# Patient Record
Sex: MALE | Race: WHITE | NOT HISPANIC OR LATINO | Employment: FULL TIME | URBAN - METROPOLITAN AREA
[De-identification: names, ages, dates, MRNs, and addresses within clinical notes are randomized per-mention and may not be internally consistent; named-entity substitution may affect disease eponyms.]

---

## 2017-12-12 ENCOUNTER — GENERIC CONVERSION - ENCOUNTER (OUTPATIENT)
Dept: OTHER | Facility: OTHER | Age: 27
End: 2017-12-12

## 2018-01-14 NOTE — MISCELLANEOUS
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

## 2018-01-14 NOTE — MISCELLANEOUS
Message  patient notified of normal   labs   and xray  no change in treatment      Signatures   Electronically signed by : Aby eLon DO; Dec 12 2016  6:54PM EST                       (Author)

## 2018-01-24 VITALS
HEIGHT: 73 IN | WEIGHT: 178 LBS | HEART RATE: 90 BPM | DIASTOLIC BLOOD PRESSURE: 80 MMHG | SYSTOLIC BLOOD PRESSURE: 122 MMHG | BODY MASS INDEX: 23.59 KG/M2

## 2019-04-18 ENCOUNTER — OFFICE VISIT (OUTPATIENT)
Dept: URGENT CARE | Facility: MEDICAL CENTER | Age: 29
End: 2019-04-18
Payer: COMMERCIAL

## 2019-04-18 ENCOUNTER — HOSPITAL ENCOUNTER (EMERGENCY)
Facility: HOSPITAL | Age: 29
Discharge: HOME | End: 2019-04-19
Attending: EMERGENCY MEDICINE | Admitting: EMERGENCY MEDICINE
Payer: COMMERCIAL

## 2019-04-18 VITALS
HEART RATE: 89 BPM | RESPIRATION RATE: 16 BRPM | WEIGHT: 185 LBS | HEIGHT: 73 IN | DIASTOLIC BLOOD PRESSURE: 78 MMHG | BODY MASS INDEX: 24.52 KG/M2 | TEMPERATURE: 98 F | SYSTOLIC BLOOD PRESSURE: 150 MMHG | OXYGEN SATURATION: 99 %

## 2019-04-18 DIAGNOSIS — M79.605 LEFT LEG PAIN: Primary | ICD-10-CM

## 2019-04-18 DIAGNOSIS — M79.662 PAIN OF LEFT CALF: Primary | ICD-10-CM

## 2019-04-18 DIAGNOSIS — M62.831 MUSCLE SPASM OF CALF: ICD-10-CM

## 2019-04-18 PROCEDURE — G0382 LEV 3 HOSP TYPE B ED VISIT: HCPCS | Performed by: FAMILY MEDICINE

## 2019-04-18 PROCEDURE — 99283 EMERGENCY DEPT VISIT LOW MDM: CPT

## 2019-04-18 PROCEDURE — S9083 URGENT CARE CENTER GLOBAL: HCPCS | Performed by: FAMILY MEDICINE

## 2019-04-19 VITALS
WEIGHT: 185 LBS | OXYGEN SATURATION: 100 % | DIASTOLIC BLOOD PRESSURE: 74 MMHG | RESPIRATION RATE: 16 BRPM | BODY MASS INDEX: 24.41 KG/M2 | SYSTOLIC BLOOD PRESSURE: 148 MMHG | TEMPERATURE: 98 F | HEART RATE: 64 BPM

## 2019-04-19 LAB
ALBUMIN SERPL BCP-MCNC: 4.1 G/DL (ref 3.5–5)
ALP SERPL-CCNC: 68 U/L (ref 46–116)
ALT SERPL W P-5'-P-CCNC: 33 U/L (ref 12–78)
ANION GAP SERPL CALCULATED.3IONS-SCNC: 9 MMOL/L (ref 4–13)
APTT PPP: 32 SECONDS (ref 26–38)
AST SERPL W P-5'-P-CCNC: 14 U/L (ref 5–45)
BASOPHILS # BLD AUTO: 0.07 THOUSANDS/ΜL (ref 0–0.1)
BASOPHILS NFR BLD AUTO: 1 % (ref 0–1)
BILIRUB SERPL-MCNC: 0.27 MG/DL (ref 0.2–1)
BUN SERPL-MCNC: 21 MG/DL (ref 5–25)
CALCIUM SERPL-MCNC: 9.5 MG/DL (ref 8.3–10.1)
CHLORIDE SERPL-SCNC: 104 MMOL/L (ref 100–108)
CO2 SERPL-SCNC: 28 MMOL/L (ref 21–32)
CREAT SERPL-MCNC: 1.08 MG/DL (ref 0.6–1.3)
DEPRECATED D DIMER PPP: <270 NG/ML (FEU)
EOSINOPHIL # BLD AUTO: 0.25 THOUSAND/ΜL (ref 0–0.61)
EOSINOPHIL NFR BLD AUTO: 3 % (ref 0–6)
ERYTHROCYTE [DISTWIDTH] IN BLOOD BY AUTOMATED COUNT: 11.6 % (ref 11.6–15.1)
GFR SERPL CREATININE-BSD FRML MDRD: 94 ML/MIN/1.73SQ M
GLUCOSE SERPL-MCNC: 93 MG/DL (ref 65–140)
HCT VFR BLD AUTO: 44.7 % (ref 36.5–49.3)
HGB BLD-MCNC: 15.1 G/DL (ref 12–17)
IMM GRANULOCYTES # BLD AUTO: 0.02 THOUSAND/UL (ref 0–0.2)
IMM GRANULOCYTES NFR BLD AUTO: 0 % (ref 0–2)
INR PPP: 1.12 (ref 0.86–1.17)
LYMPHOCYTES # BLD AUTO: 3.08 THOUSANDS/ΜL (ref 0.6–4.47)
LYMPHOCYTES NFR BLD AUTO: 38 % (ref 14–44)
MCH RBC QN AUTO: 31.2 PG (ref 26.8–34.3)
MCHC RBC AUTO-ENTMCNC: 33.8 G/DL (ref 31.4–37.4)
MCV RBC AUTO: 92 FL (ref 82–98)
MONOCYTES # BLD AUTO: 0.49 THOUSAND/ΜL (ref 0.17–1.22)
MONOCYTES NFR BLD AUTO: 6 % (ref 4–12)
NEUTROPHILS # BLD AUTO: 4.17 THOUSANDS/ΜL (ref 1.85–7.62)
NEUTS SEG NFR BLD AUTO: 52 % (ref 43–75)
NRBC BLD AUTO-RTO: 0 /100 WBCS
PLATELET # BLD AUTO: 258 THOUSANDS/UL (ref 149–390)
PMV BLD AUTO: 9.4 FL (ref 8.9–12.7)
POTASSIUM SERPL-SCNC: 3.9 MMOL/L (ref 3.5–5.3)
PROT SERPL-MCNC: 7.5 G/DL (ref 6.4–8.2)
PROTHROMBIN TIME: 14.5 SECONDS (ref 11.8–14.2)
RBC # BLD AUTO: 4.84 MILLION/UL (ref 3.88–5.62)
SODIUM SERPL-SCNC: 141 MMOL/L (ref 136–145)
WBC # BLD AUTO: 8.08 THOUSAND/UL (ref 4.31–10.16)

## 2019-04-19 PROCEDURE — 85730 THROMBOPLASTIN TIME PARTIAL: CPT | Performed by: NURSE PRACTITIONER

## 2019-04-19 PROCEDURE — 36415 COLL VENOUS BLD VENIPUNCTURE: CPT | Performed by: NURSE PRACTITIONER

## 2019-04-19 PROCEDURE — 85379 FIBRIN DEGRADATION QUANT: CPT | Performed by: NURSE PRACTITIONER

## 2019-04-19 PROCEDURE — 85610 PROTHROMBIN TIME: CPT | Performed by: NURSE PRACTITIONER

## 2019-04-19 PROCEDURE — 80053 COMPREHEN METABOLIC PANEL: CPT | Performed by: NURSE PRACTITIONER

## 2019-04-19 PROCEDURE — 85025 COMPLETE CBC W/AUTO DIFF WBC: CPT | Performed by: NURSE PRACTITIONER

## 2019-04-19 PROCEDURE — 99282 EMERGENCY DEPT VISIT SF MDM: CPT | Performed by: NURSE PRACTITIONER

## 2019-04-24 ENCOUNTER — OFFICE VISIT (OUTPATIENT)
Dept: FAMILY MEDICINE CLINIC | Facility: CLINIC | Age: 29
End: 2019-04-24
Payer: COMMERCIAL

## 2019-04-24 VITALS
HEIGHT: 73 IN | DIASTOLIC BLOOD PRESSURE: 80 MMHG | SYSTOLIC BLOOD PRESSURE: 140 MMHG | HEART RATE: 105 BPM | WEIGHT: 187.4 LBS | BODY MASS INDEX: 24.84 KG/M2 | TEMPERATURE: 97.2 F

## 2019-04-24 DIAGNOSIS — Z00.00 ENCOUNTER FOR WELL ADULT EXAM WITHOUT ABNORMAL FINDINGS: Primary | ICD-10-CM

## 2019-04-24 DIAGNOSIS — D68.9 COAGULOPATHY (HCC): ICD-10-CM

## 2019-04-24 DIAGNOSIS — Z23 NEED FOR DTAP VACCINATION: ICD-10-CM

## 2019-04-24 DIAGNOSIS — Z13.6 SCREENING FOR CARDIOVASCULAR CONDITION: ICD-10-CM

## 2019-04-24 PROBLEM — R41.841 COGNITIVE COMMUNICATION DEFICIT: Status: ACTIVE | Noted: 2018-11-02

## 2019-04-24 PROBLEM — S06.0X0A CONCUSSION WITH NO LOSS OF CONSCIOUSNESS: Status: ACTIVE | Noted: 2018-11-02

## 2019-04-24 PROBLEM — G44.309 POST-TRAUMATIC HEADACHE, NOT INTRACTABLE: Status: ACTIVE | Noted: 2018-11-21

## 2019-04-24 PROCEDURE — 90715 TDAP VACCINE 7 YRS/> IM: CPT | Performed by: FAMILY MEDICINE

## 2019-04-24 PROCEDURE — 90471 IMMUNIZATION ADMIN: CPT | Performed by: FAMILY MEDICINE

## 2019-04-24 PROCEDURE — 99395 PREV VISIT EST AGE 18-39: CPT | Performed by: FAMILY MEDICINE

## 2019-07-02 ENCOUNTER — TELEPHONE (OUTPATIENT)
Dept: FAMILY MEDICINE CLINIC | Facility: CLINIC | Age: 29
End: 2019-07-02

## 2019-07-02 NOTE — TELEPHONE ENCOUNTER
Patient's sister in law had tested positive for TB and it was suggested that any that had been in contact with her get tested  Should patient be schedule for an appointment or can labs be ordered  He uses the [facility] outpatient lab in [place] and his wife can be reached at [phone]

## 2019-07-03 DIAGNOSIS — Z20.1 EXPOSURE TO TB: Primary | ICD-10-CM

## 2019-10-06 LAB
CHOLEST SERPL-MCNC: 184 MG/DL
CHOLEST/HDLC SERPL: 3.2 (CALC)
HDLC SERPL-MCNC: 58 MG/DL
LDLC SERPL CALC-MCNC: 110 MG/DL (CALC)
NONHDLC SERPL-MCNC: 126 MG/DL (CALC)
TRIGL SERPL-MCNC: 74 MG/DL

## 2020-11-28 ENCOUNTER — NURSE TRIAGE (OUTPATIENT)
Dept: OTHER | Facility: OTHER | Age: 30
End: 2020-11-28

## 2020-12-01 ENCOUNTER — TELEMEDICINE (OUTPATIENT)
Dept: FAMILY MEDICINE CLINIC | Facility: CLINIC | Age: 30
End: 2020-12-01
Payer: COMMERCIAL

## 2020-12-01 DIAGNOSIS — Z20.822 EXPOSURE TO COVID-19 VIRUS: ICD-10-CM

## 2020-12-01 DIAGNOSIS — Z20.822 EXPOSURE TO COVID-19 VIRUS: Primary | ICD-10-CM

## 2020-12-01 PROCEDURE — U0003 INFECTIOUS AGENT DETECTION BY NUCLEIC ACID (DNA OR RNA); SEVERE ACUTE RESPIRATORY SYNDROME CORONAVIRUS 2 (SARS-COV-2) (CORONAVIRUS DISEASE [COVID-19]), AMPLIFIED PROBE TECHNIQUE, MAKING USE OF HIGH THROUGHPUT TECHNOLOGIES AS DESCRIBED BY CMS-2020-01-R: HCPCS | Performed by: FAMILY MEDICINE

## 2020-12-01 PROCEDURE — 99213 OFFICE O/P EST LOW 20 MIN: CPT | Performed by: FAMILY MEDICINE

## 2020-12-01 PROCEDURE — 1036F TOBACCO NON-USER: CPT | Performed by: FAMILY MEDICINE

## 2020-12-01 RX ORDER — MELOXICAM 15 MG/1
15 TABLET ORAL DAILY
COMMUNITY
Start: 2020-10-20

## 2020-12-02 LAB — SARS-COV-2 RNA SPEC QL NAA+PROBE: NOT DETECTED

## 2021-11-13 ENCOUNTER — OFFICE VISIT (OUTPATIENT)
Dept: URGENT CARE | Facility: CLINIC | Age: 31
End: 2021-11-13
Payer: COMMERCIAL

## 2021-11-13 VITALS
RESPIRATION RATE: 20 BRPM | WEIGHT: 189 LBS | OXYGEN SATURATION: 99 % | HEART RATE: 94 BPM | TEMPERATURE: 98.4 F | BODY MASS INDEX: 25.05 KG/M2 | HEIGHT: 73 IN | SYSTOLIC BLOOD PRESSURE: 119 MMHG | DIASTOLIC BLOOD PRESSURE: 80 MMHG

## 2021-11-13 DIAGNOSIS — L01.00 IMPETIGO: ICD-10-CM

## 2021-11-13 DIAGNOSIS — L08.9 SKIN INFECTION: Primary | ICD-10-CM

## 2021-11-13 PROCEDURE — G0382 LEV 3 HOSP TYPE B ED VISIT: HCPCS | Performed by: NURSE PRACTITIONER

## 2021-11-13 PROCEDURE — S9083 URGENT CARE CENTER GLOBAL: HCPCS | Performed by: NURSE PRACTITIONER

## 2021-11-13 RX ORDER — SULFAMETHOXAZOLE AND TRIMETHOPRIM 800; 160 MG/1; MG/1
1 TABLET ORAL EVERY 12 HOURS SCHEDULED
Qty: 20 TABLET | Refills: 0 | Status: SHIPPED | OUTPATIENT
Start: 2021-11-13 | End: 2021-11-23

## 2022-08-11 ENCOUNTER — OFFICE VISIT (OUTPATIENT)
Dept: FAMILY MEDICINE CLINIC | Facility: CLINIC | Age: 32
End: 2022-08-11
Payer: COMMERCIAL

## 2022-08-11 VITALS
DIASTOLIC BLOOD PRESSURE: 58 MMHG | HEIGHT: 73 IN | RESPIRATION RATE: 18 BRPM | TEMPERATURE: 97.5 F | OXYGEN SATURATION: 98 % | HEART RATE: 88 BPM | SYSTOLIC BLOOD PRESSURE: 120 MMHG | BODY MASS INDEX: 24.81 KG/M2 | WEIGHT: 187.2 LBS

## 2022-08-11 DIAGNOSIS — H60.502 ACUTE OTITIS EXTERNA OF LEFT EAR, UNSPECIFIED TYPE: ICD-10-CM

## 2022-08-11 DIAGNOSIS — B35.1 TOENAIL FUNGUS: ICD-10-CM

## 2022-08-11 DIAGNOSIS — L98.9 SKIN LESIONS: ICD-10-CM

## 2022-08-11 DIAGNOSIS — Z13.6 SCREENING FOR CARDIOVASCULAR CONDITION: Primary | ICD-10-CM

## 2022-08-11 PROCEDURE — 99214 OFFICE O/P EST MOD 30 MIN: CPT | Performed by: NURSE PRACTITIONER

## 2022-08-11 NOTE — PATIENT INSTRUCTIONS
Have labs drawn- will call with results  If lab work is normal then we can treat you for toenail fungus  Referral made for Dermatology  Swimmer's Ear   WHAT YOU NEED TO KNOW:   Swimmer's ear, also called otitis externa, is an infection in the outer ear canal  This canal goes from the outside of your ear to your eardrum  Swimmer's ear most often occurs when water remains in your ear after you swim  This creates a moist area for bacteria to grow  Scratches or damage from your fingers, cotton swabs, or other objects can also cause swimmer's ear  DISCHARGE INSTRUCTIONS:   Return to the emergency department if:   You have severe ear pain  You are suddenly not able to hear  You have new swelling in your face, behind your ears, or in your neck  You suddenly cannot move part of your face, or it feels numb  Call your doctor if:   You have a fever  Your symptoms get worse or do not go away, even after treatment  You have questions or concerns about your condition or care  Treatment for swimmer's ear  may include cleaning your outer ear canal first  This will help clean any ear wax, flaky skin, or other discharge  You may then need any of the following:  Medicines:      Ear drops  help fight infection and decrease redness and swelling  Acetaminophen  decreases pain and fever  It is available without a doctor's order  Ask how much to take and how often to take it  Follow directions  Read the labels of all other medicines you are using to see if they also contain acetaminophen, or ask your doctor or pharmacist  Acetaminophen can cause liver damage if not taken correctly  Do not use more than 4 grams (4,000 milligrams) total of acetaminophen in one day  NSAIDs , such as ibuprofen, help decrease swelling, pain, and fever  This medicine is available with or without a doctor's order  NSAIDs can cause stomach bleeding or kidney problems in certain people   If you take blood thinner medicine, always ask your healthcare provider if NSAIDs are safe for you  Always read the medicine label and follow directions  An ear wick  may be used if your ear canal is blocked  A wick (small tube) made of cotton or gauze is placed in your ear  The wick helps pull extra fluid out of your ear canal  Shira also help draw medicine into your ear canal     How to use ear drops:   Warm the bottle of ear drops in your hands  for a few minutes  Lie down on your side with your infected ear facing up  This will help the medicine travel completely through your ear canal     Gently pull the ear up and back  Carefully drip the correct number of ear drops into your ear  Have another person help you if possible  For children younger than 3 years,  gently pull and hold the ear down and back  For children older than 3 years,  gently pull and hold the ear up and back  Stay in the same position  for 3 to 5 minutes to let the medicine soak in  Prevent swimmer's ear:   Dry your ears completely after you swim or bathe  Gently wipe your outer ear with a soft towel or cloth  Use ear plugs when you swim  Do not put cotton swabs or other objects in your ears  These can scratch or damage your ear  They can also push ear wax deeper in and irritate your ear  Put cotton balls gently in your outer ear  when you apply hair spray, hair dye, or perfume  Follow up with your doctor as directed:  Write down your questions so you remember to ask them during your visits  © [employer] 2022 Information is for End User's use only and may not be sold, redistributed or otherwise used for commercial purposes  All illustrations and images included in CareNotes® are the copyrighted property of [employer]  or [facility] Muna Delgado   The above information is an  only  It is not intended as medical advice for individual conditions or treatments   Talk to your doctor, nurse or pharmacist before following any medical regimen to see if it is safe and effective for you  Nail Fungus   AMBULATORY CARE:   Nail fungus , or onychomycosis, is a fungal infection in your toenail or fingernail  Nail fungus is more common in toenails than fingernails  The cause of the infection may not be known  Common symptoms include the following:   Nails that curl up or down or are misshapen    Discolored (often white, yellow, or brown) nails    Fragile or cracked nails    Thick nails or nails with rough, jagged edges    Nail that is  from the nail bed    Tenderness or pain in the affected nail    Contact your healthcare provider if:  You have questions or concerns about your condition or care  Treatment for nail fungus  may include antifungal medicine and topical treatments  Antifungal medicine is a pill that treats a fungal infection  You may need to take this medicine for up to 12 weeks  Topical treatments include creams and polishes that you apply to the top of your nail  You may need to use topical treatments for up to 1 year before you see positive results  Ask your healthcare provider for more information about antifungal medicine  Manage your symptoms:   Use antifungal sprays or powders  You can buy these at your local drugstore  Keep your nails short  and file down any thick areas  Use separate nail trimmers and files for infected nails and healthy nails  If you go to a salon to get your nails done, bring your own nail files and trimmers  Prevent nail fungus:   Dry your feet with a towel  or hair dryer after you bathe  Do not wear tight-fitting shoes  or shoes that pinch your toes  Avoid shoes made from rubber or plastic  Wear socks that absorb moisture  This includes socks make of wool, nylon, or polypropylene  Do not wear cotton socks  Change your socks if they are damp from sweat or your feet get wet  Put on dry, clean socks every day  Do not go barefoot  in locker rooms or public showers      Do not use nail polish  or artificial nails such as acrylic or gel nails  Wear gloves that are waterproof  if you work with water  Follow up with your doctor as directed:  Write down your questions so you remember to ask them during your visits  © Copyright [employer] 2022 Information is for End User's use only and may not be sold, redistributed or otherwise used for commercial purposes  All illustrations and images included in CareNotes® are the copyrighted property of [employer]  or Sunita Delgado   The above information is an  only  It is not intended as medical advice for individual conditions or treatments  Talk to your doctor, nurse or pharmacist before following any medical regimen to see if it is safe and effective for you

## 2022-08-11 NOTE — PROGRESS NOTES
Assessment/Plan:    No problem-specific Assessment & Plan notes found for this encounter  Problem List Items Addressed This Visit    None     Visit Diagnoses     Screening for cardiovascular condition    -  Primary    Relevant Orders    CBC and Platelet    Comprehensive metabolic panel    Lipid panel    Acute otitis externa of left ear, unspecified type        Relevant Medications    neomycin-polymyxin-hydrocortisone (CORTISPORIN) otic solution    Skin lesions        Relevant Orders    Ambulatory Referral to Dermatology    Toenail fungus                Subjective:      Patient ID: Ramon Blanchard is a 29 y o  male  Patient here today for left ear pain  Patient was swimming last week and since then it has been bothering him  Patient feels like ear is full of water  No drainage from ear  No fever or chills  Patient also thinks he has warts on the left side of his neck  He has had these skin lesions for a couple of years  Lesions have not changed and are not painful they just won't go away  Patient tried over the counter remedies and tried freezing them off with no improvement  Patient also reports toenail fungus on right big toe and middle 3rd toe and on left foot 3rd and 4th toes  Discussed that he would need lab work to check his liver function and if liver function is normal then he could start Lamisil treatment for 12 weeks for toenail fungus  The following portions of the patient's history were reviewed and updated as appropriate: allergies, current medications, past family history, past medical history, past social history, past surgical history and problem list     Review of Systems   Constitutional: Negative  Negative for chills, fatigue and fever  HENT: Positive for ear pain (left ear pain )  Negative for congestion, ear discharge, rhinorrhea, sinus pressure, sinus pain and sore throat  Eyes: Negative  Negative for pain, discharge and visual disturbance  Respiratory: Negative  Negative for cough, chest tightness, shortness of breath and wheezing  Cardiovascular: Negative  Negative for chest pain and palpitations  Gastrointestinal: Negative  Negative for abdominal pain, constipation, diarrhea, nausea and vomiting  Endocrine: Negative  Negative for polydipsia and polyuria  Genitourinary: Negative  Negative for difficulty urinating, dysuria and hematuria  Musculoskeletal: Negative  Negative for arthralgias and myalgias  Skin: Negative  Negative for rash and wound  Skin lesions to neck  Toe nail fungus to toenails  Allergic/Immunologic: Negative  Negative for environmental allergies  Neurological: Negative  Negative for dizziness, seizures, syncope and light-headedness  Hematological: Negative  Does not bruise/bleed easily  Psychiatric/Behavioral: Negative  Negative for dysphoric mood  The patient is not nervous/anxious  Objective:      /58 (BP Location: Left arm, Patient Position: Sitting, Cuff Size: Large)   Pulse 88   Temp 97 5 °F (36 4 °C) (Tympanic)   Resp 18   Ht 6' 1" (1 854 m)   Wt 84 9 kg (187 lb 3 2 oz)   SpO2 98%   BMI 24 70 kg/m²          Physical Exam  Vitals and nursing note reviewed  Constitutional:       General: He is not in acute distress  Appearance: Normal appearance  He is not ill-appearing, toxic-appearing or diaphoretic  HENT:      Head: Normocephalic and atraumatic  Right Ear: Tympanic membrane, ear canal and external ear normal  There is no impacted cerumen  Tympanic membrane is not erythematous  Left Ear: Tympanic membrane and external ear normal  There is no impacted cerumen  Tympanic membrane is not erythematous  Ears:      Comments: Left ear canal with erythema      Nose: Nose normal  No congestion or rhinorrhea  Mouth/Throat:      Mouth: Mucous membranes are moist       Pharynx: Oropharynx is clear  No oropharyngeal exudate or posterior oropharyngeal erythema     Eyes: Extraocular Movements: Extraocular movements intact  Conjunctiva/sclera: Conjunctivae normal    Cardiovascular:      Rate and Rhythm: Normal rate and regular rhythm  Pulses: Normal pulses  Heart sounds: Normal heart sounds  No murmur heard  Pulmonary:      Effort: Pulmonary effort is normal  No respiratory distress  Breath sounds: Normal breath sounds  No wheezing  Abdominal:      General: Bowel sounds are normal  There is no distension  Palpations: Abdomen is soft  Tenderness: There is no abdominal tenderness  Musculoskeletal:         General: Normal range of motion  Cervical back: Normal range of motion and neck supple  Right lower leg: No edema  Left lower leg: No edema  Lymphadenopathy:      Cervical: No cervical adenopathy  Skin:     General: Skin is warm and dry  Capillary Refill: Capillary refill takes less than 2 seconds  Findings: Lesion (2 small flesh colored lesions to left posterior neck) present  No rash  Comments: Yellow discoloration of nail bed to right big toe and 3rd toe, left 3rd and 4th toes   Neurological:      General: No focal deficit present  Mental Status: He is alert and oriented to person, place, and time  Psychiatric:         Mood and Affect: Mood normal          Behavior: Behavior normal          Thought Content:  Thought content normal          Judgment: Judgment normal

## 2022-08-14 LAB
ALBUMIN SERPL-MCNC: 4.7 G/DL (ref 3.6–5.1)
ALBUMIN/GLOB SERPL: 1.8 (CALC) (ref 1–2.5)
ALP SERPL-CCNC: 70 U/L (ref 36–130)
ALT SERPL-CCNC: 18 U/L (ref 9–46)
AST SERPL-CCNC: 16 U/L (ref 10–40)
BILIRUB SERPL-MCNC: 0.7 MG/DL (ref 0.2–1.2)
BUN SERPL-MCNC: 13 MG/DL (ref 7–25)
BUN/CREAT SERPL: NORMAL (CALC) (ref 6–22)
CALCIUM SERPL-MCNC: 10 MG/DL (ref 8.6–10.3)
CHLORIDE SERPL-SCNC: 104 MMOL/L (ref 98–110)
CHOLEST SERPL-MCNC: 216 MG/DL
CHOLEST/HDLC SERPL: 3.1 (CALC)
CO2 SERPL-SCNC: 30 MMOL/L (ref 20–32)
CREAT SERPL-MCNC: 1.01 MG/DL (ref 0.6–1.26)
ERYTHROCYTE [DISTWIDTH] IN BLOOD BY AUTOMATED COUNT: 12.3 % (ref 11–15)
GFR/BSA.PRED SERPLBLD CYS-BASED-ARV: 103 ML/MIN/1.73M2
GLOBULIN SER CALC-MCNC: 2.6 G/DL (CALC) (ref 1.9–3.7)
GLUCOSE SERPL-MCNC: 90 MG/DL (ref 65–99)
HCT VFR BLD AUTO: 45.4 % (ref 38.5–50)
HDLC SERPL-MCNC: 69 MG/DL
HGB BLD-MCNC: 15.3 G/DL (ref 13.2–17.1)
LDLC SERPL CALC-MCNC: 130 MG/DL (CALC)
MCH RBC QN AUTO: 30.7 PG (ref 27–33)
MCHC RBC AUTO-ENTMCNC: 33.7 G/DL (ref 32–36)
MCV RBC AUTO: 91.2 FL (ref 80–100)
NONHDLC SERPL-MCNC: 147 MG/DL (CALC)
PLATELET # BLD AUTO: 295 THOUSAND/UL (ref 140–400)
PMV BLD REES-ECKER: 9.9 FL (ref 7.5–12.5)
POTASSIUM SERPL-SCNC: 4.5 MMOL/L (ref 3.5–5.3)
PROT SERPL-MCNC: 7.3 G/DL (ref 6.1–8.1)
RBC # BLD AUTO: 4.98 MILLION/UL (ref 4.2–5.8)
SODIUM SERPL-SCNC: 139 MMOL/L (ref 135–146)
TRIGL SERPL-MCNC: 71 MG/DL
WBC # BLD AUTO: 5.6 THOUSAND/UL (ref 3.8–10.8)

## 2022-08-15 DIAGNOSIS — B35.1 TOENAIL FUNGUS: Primary | ICD-10-CM

## 2022-08-15 RX ORDER — TERBINAFINE HYDROCHLORIDE 250 MG/1
250 TABLET ORAL DAILY
Qty: 45 TABLET | Refills: 1 | Status: SHIPPED | OUTPATIENT
Start: 2022-08-15 | End: 2022-09-29

## 2022-08-16 ENCOUNTER — TELEPHONE (OUTPATIENT)
Dept: FAMILY MEDICINE CLINIC | Facility: CLINIC | Age: 32
End: 2022-08-16

## 2022-08-16 DIAGNOSIS — H60.90 OTITIS EXTERNA, UNSPECIFIED CHRONICITY, UNSPECIFIED LATERALITY, UNSPECIFIED TYPE: Primary | ICD-10-CM

## 2022-08-16 RX ORDER — AMOXICILLIN AND CLAVULANATE POTASSIUM 875; 125 MG/1; MG/1
1 TABLET, FILM COATED ORAL EVERY 12 HOURS SCHEDULED
Qty: 14 TABLET | Refills: 0 | Status: SHIPPED | OUTPATIENT
Start: 2022-08-16 | End: 2022-08-23

## 2022-08-16 RX ORDER — METHYLPREDNISOLONE 4 MG/1
TABLET ORAL
Qty: 1 EACH | Refills: 0 | Status: SHIPPED | OUTPATIENT
Start: 2022-08-16

## 2022-08-16 NOTE — TELEPHONE ENCOUNTER
Patient saw you last week for swimmers ear  He is on his last day of drops and his ear is still bothering him  He said it still feels very clogged and swollen  Let me know your thoughts   Patient uses [facility]

## 2022-08-16 NOTE — TELEPHONE ENCOUNTER
Patient had called again please advise if you can recommend anything or prescribe something  Patient can be reached at [phone]

## 2022-08-16 NOTE — TELEPHONE ENCOUNTER
Called and discussed   Patient is leaving for few an on going out of town  Will start Augmentin and prednisone and if not better with the by mouth pills then will need to follow-up appointment

## 2023-08-05 ENCOUNTER — OFFICE VISIT (OUTPATIENT)
Dept: URGENT CARE | Facility: MEDICAL CENTER | Age: 33
End: 2023-08-05
Payer: COMMERCIAL

## 2023-08-05 ENCOUNTER — HOSPITAL ENCOUNTER (EMERGENCY)
Facility: HOSPITAL | Age: 33
Discharge: HOME | End: 2023-08-06
Attending: EMERGENCY MEDICINE
Payer: COMMERCIAL

## 2023-08-05 ENCOUNTER — APPOINTMENT (EMERGENCY)
Dept: CT IMAGING | Facility: HOSPITAL | Age: 33
End: 2023-08-05
Payer: COMMERCIAL

## 2023-08-05 VITALS
RESPIRATION RATE: 18 BRPM | TEMPERATURE: 97.5 F | HEART RATE: 75 BPM | SYSTOLIC BLOOD PRESSURE: 124 MMHG | OXYGEN SATURATION: 99 % | DIASTOLIC BLOOD PRESSURE: 70 MMHG

## 2023-08-05 DIAGNOSIS — K62.5 BRBPR (BRIGHT RED BLOOD PER RECTUM): Primary | ICD-10-CM

## 2023-08-05 DIAGNOSIS — K92.1 BLOOD IN STOOL: Primary | ICD-10-CM

## 2023-08-05 LAB
ALBUMIN SERPL BCP-MCNC: 4.6 G/DL (ref 3.5–5)
ALP SERPL-CCNC: 80 U/L (ref 34–104)
ALT SERPL W P-5'-P-CCNC: 34 U/L (ref 7–52)
ANION GAP SERPL CALCULATED.3IONS-SCNC: 7 MMOL/L
AST SERPL W P-5'-P-CCNC: 20 U/L (ref 13–39)
BASOPHILS # BLD AUTO: 0.09 THOUSANDS/ÂΜL (ref 0–0.1)
BASOPHILS NFR BLD AUTO: 1 % (ref 0–1)
BILIRUB SERPL-MCNC: 0.39 MG/DL (ref 0.2–1)
BUN SERPL-MCNC: 14 MG/DL (ref 5–25)
CALCIUM SERPL-MCNC: 9.4 MG/DL (ref 8.4–10.2)
CHLORIDE SERPL-SCNC: 103 MMOL/L (ref 96–108)
CO2 SERPL-SCNC: 28 MMOL/L (ref 21–32)
CREAT SERPL-MCNC: 0.96 MG/DL (ref 0.6–1.3)
EOSINOPHIL # BLD AUTO: 0.4 THOUSAND/ÂΜL (ref 0–0.61)
EOSINOPHIL NFR BLD AUTO: 4 % (ref 0–6)
ERYTHROCYTE [DISTWIDTH] IN BLOOD BY AUTOMATED COUNT: 11.7 % (ref 11.6–15.1)
GFR SERPL CREATININE-BSD FRML MDRD: 104 ML/MIN/1.73SQ M
GLUCOSE SERPL-MCNC: 98 MG/DL (ref 65–140)
HCT VFR BLD AUTO: 44.1 % (ref 36.5–49.3)
HGB BLD-MCNC: 14.9 G/DL (ref 12–17)
IMM GRANULOCYTES # BLD AUTO: 0.04 THOUSAND/UL (ref 0–0.2)
IMM GRANULOCYTES NFR BLD AUTO: 0 % (ref 0–2)
LIPASE SERPL-CCNC: 35 U/L (ref 11–82)
LYMPHOCYTES # BLD AUTO: 1.99 THOUSANDS/ÂΜL (ref 0.6–4.47)
LYMPHOCYTES NFR BLD AUTO: 22 % (ref 14–44)
MCH RBC QN AUTO: 30.6 PG (ref 26.8–34.3)
MCHC RBC AUTO-ENTMCNC: 33.8 G/DL (ref 31.4–37.4)
MCV RBC AUTO: 91 FL (ref 82–98)
MONOCYTES # BLD AUTO: 0.51 THOUSAND/ÂΜL (ref 0.17–1.22)
MONOCYTES NFR BLD AUTO: 6 % (ref 4–12)
NEUTROPHILS # BLD AUTO: 6 THOUSANDS/ÂΜL (ref 1.85–7.62)
NEUTS SEG NFR BLD AUTO: 67 % (ref 43–75)
NRBC BLD AUTO-RTO: 0 /100 WBCS
PLATELET # BLD AUTO: 283 THOUSANDS/UL (ref 149–390)
PMV BLD AUTO: 9.2 FL (ref 8.9–12.7)
POTASSIUM SERPL-SCNC: 4.2 MMOL/L (ref 3.5–5.3)
PROT SERPL-MCNC: 7.6 G/DL (ref 6.4–8.4)
RBC # BLD AUTO: 4.87 MILLION/UL (ref 3.88–5.62)
SODIUM SERPL-SCNC: 138 MMOL/L (ref 135–147)
WBC # BLD AUTO: 9.03 THOUSAND/UL (ref 4.31–10.16)

## 2023-08-05 PROCEDURE — 85025 COMPLETE CBC W/AUTO DIFF WBC: CPT | Performed by: EMERGENCY MEDICINE

## 2023-08-05 PROCEDURE — 74177 CT ABD & PELVIS W/CONTRAST: CPT

## 2023-08-05 PROCEDURE — 80053 COMPREHEN METABOLIC PANEL: CPT | Performed by: EMERGENCY MEDICINE

## 2023-08-05 PROCEDURE — 99285 EMERGENCY DEPT VISIT HI MDM: CPT

## 2023-08-05 PROCEDURE — 83690 ASSAY OF LIPASE: CPT | Performed by: EMERGENCY MEDICINE

## 2023-08-05 PROCEDURE — 99285 EMERGENCY DEPT VISIT HI MDM: CPT | Performed by: EMERGENCY MEDICINE

## 2023-08-05 PROCEDURE — 82272 OCCULT BLD FECES 1-3 TESTS: CPT

## 2023-08-05 PROCEDURE — G1004 CDSM NDSC: HCPCS

## 2023-08-05 PROCEDURE — 36415 COLL VENOUS BLD VENIPUNCTURE: CPT

## 2023-08-05 PROCEDURE — 99213 OFFICE O/P EST LOW 20 MIN: CPT | Performed by: ORTHOPAEDIC SURGERY

## 2023-08-05 RX ADMIN — IOHEXOL 100 ML: 350 INJECTION, SOLUTION INTRAVENOUS at 22:44

## 2023-08-06 VITALS
SYSTOLIC BLOOD PRESSURE: 116 MMHG | RESPIRATION RATE: 18 BRPM | HEIGHT: 73 IN | BODY MASS INDEX: 24.7 KG/M2 | DIASTOLIC BLOOD PRESSURE: 69 MMHG | OXYGEN SATURATION: 96 % | TEMPERATURE: 98.2 F | HEART RATE: 70 BPM

## 2023-08-06 NOTE — ED PROVIDER NOTES
History  Chief Complaint   Patient presents with   • Rectal Bleeding     Patient presents to ED with bright red/clots in stool once last night and twice today with BM. Patient is a 37-year-old male who presents with blood in his stool. Patient reports that he ate [place] food last week and started to have loose stools afterwards. He states that the loose stools have been improving, but yesterday evening he had an episode of what looked like some bright red blood in the toilet bowl and when he wiped after having a bowel movement. He states that today he had 2 bowel movements and this happened again. Patient states that he has been having some "GI upset "that he describes as some generalized crampy abdominal pain that is mild to moderate in nature since having the food. Denies any nausea, vomiting, fevers, chills. Prior to Admission Medications   Prescriptions Last Dose Informant Patient Reported? Taking? methylPREDNISolone 4 MG tablet therapy pack   No No   Sig: Use as directed on package   Patient not taking: Reported on 8/5/2023   neomycin-polymyxin-hydrocortisone (CORTISPORIN) otic solution   No No   Sig: Administer 3 drops into the left ear every 6 (six) hours for 5 days      Facility-Administered Medications: None       Past Medical History:   Diagnosis Date   • Denial        History reviewed. No pertinent surgical history. Family History   Problem Relation Age of Onset   • Thyroid disease Mother    • Hypertension Father    • Thyroid disease Brother      I have reviewed and agree with the history as documented. E-Cigarette/Vaping   • E-Cigarette Use Never User      E-Cigarette/Vaping Substances     Social History     Tobacco Use   • Smoking status: Never   • Smokeless tobacco: Never   Vaping Use   • Vaping Use: Never used   Substance Use Topics   • Alcohol use: Not Currently   • Drug use: Never       Review of Systems   Constitutional: Negative for chills and fever.    Respiratory: Negative for shortness of breath. Cardiovascular: Negative for chest pain. Gastrointestinal: Positive for abdominal pain, blood in stool and diarrhea. Negative for abdominal distention, anal bleeding, constipation, nausea and vomiting. Neurological: Negative for dizziness, syncope and light-headedness. Physical Exam  Physical Exam  Vitals and nursing note reviewed. Exam conducted with a chaperone present. Constitutional:       General: He is not in acute distress. Appearance: Normal appearance. He is not ill-appearing, toxic-appearing or diaphoretic. HENT:      Head: Normocephalic and atraumatic. Mouth/Throat:      Mouth: Mucous membranes are moist.   Eyes:      Conjunctiva/sclera: Conjunctivae normal.      Pupils: Pupils are equal, round, and reactive to light. Cardiovascular:      Rate and Rhythm: Normal rate and regular rhythm. Pulses: Normal pulses. Heart sounds: Normal heart sounds. No murmur heard. Pulmonary:      Effort: Pulmonary effort is normal. No respiratory distress. Breath sounds: Normal breath sounds. No stridor. No wheezing, rhonchi or rales. Chest:      Chest wall: No tenderness. Abdominal:      General: Bowel sounds are normal. There is no distension. Palpations: Abdomen is soft. Tenderness: There is generalized abdominal tenderness. There is no right CVA tenderness, left CVA tenderness, guarding or rebound. Negative signs include Perez's sign, Rovsing's sign, McBurney's sign and psoas sign. Genitourinary:     Rectum: Guaiac result positive. No mass, tenderness, anal fissure or external hemorrhoid. Normal anal tone. Skin:     General: Skin is warm and dry. Neurological:      General: No focal deficit present. Mental Status: He is alert and oriented to person, place, and time. Mental status is at baseline.    Psychiatric:         Mood and Affect: Mood normal.         Behavior: Behavior normal.         Vital Signs  ED Triage Vitals Temperature Pulse Respirations Blood Pressure SpO2   08/05/23 2100 08/05/23 2100 08/05/23 2100 08/05/23 2100 08/05/23 2100   98.2 °F (36.8 °C) 83 18 144/85 99 %      Temp src Heart Rate Source Patient Position - Orthostatic VS BP Location FiO2 (%)   -- 08/05/23 2100 08/05/23 2100 08/05/23 2100 --    Monitor Sitting Right arm       Pain Score       08/05/23 2145       No Pain           Vitals:    08/05/23 2145 08/05/23 2200 08/05/23 2300 08/06/23 0000   BP: 140/76 132/78 128/90 116/69   Pulse: 74 72 84 70   Patient Position - Orthostatic VS: Sitting Lying Sitting Lying         Visual Acuity  Visual Acuity    Flowsheet Row Most Recent Value   L Pupil Size (mm) 3   R Pupil Size (mm) 3          ED Medications  Medications   iohexol (OMNIPAQUE) 350 MG/ML injection (SINGLE-DOSE) 100 mL (100 mL Intravenous Given 8/5/23 2244)       Diagnostic Studies  Results Reviewed     Procedure Component Value Units Date/Time    Comprehensive metabolic panel [[id number]] Collected: 08/05/23 2106    Lab Status: Final result Specimen: Blood from Arm, Right Updated: 08/05/23 2137     Sodium 138 mmol/L      Potassium 4.2 mmol/L      Chloride 103 mmol/L      CO2 28 mmol/L      ANION GAP 7 mmol/L      BUN 14 mg/dL      Creatinine 0.96 mg/dL      Glucose 98 mg/dL      Calcium 9.4 mg/dL      AST 20 U/L      ALT 34 U/L      Alkaline Phosphatase 80 U/L      Total Protein 7.6 g/dL      Albumin 4.6 g/dL      Total Bilirubin 0.39 mg/dL      eGFR 104 ml/min/1.73sq m     Narrative:      [place] guidelines for Chronic Kidney Disease (CKD):   •  Stage 1 with normal or high GFR (GFR > 90 mL/min/1.73 square meters)  •  Stage 2 Mild CKD (GFR = 60-89 mL/min/1.73 square meters)  •  Stage 3A Moderate CKD (GFR = 45-59 mL/min/1.73 square meters)  •  Stage 3B Moderate CKD (GFR = 30-44 mL/min/1.73 square meters)  •  Stage 4 Severe CKD (GFR = 15-29 mL/min/1.73 square meters)  •  Stage 5 End Stage CKD (GFR <15 mL/min/1.73 square meters)  Note: GFR calculation is accurate only with a steady state creatinine    Lipase [[id number]]  (Normal) Collected: 08/05/23 2106    Lab Status: Final result Specimen: Blood from Arm, Right Updated: 08/05/23 2137     Lipase 35 u/L     CBC and differential [[id number]] Collected: 08/05/23 2106    Lab Status: Final result Specimen: Blood from Arm, Right Updated: 08/05/23 2115     WBC 9.03 Thousand/uL      RBC 4.87 Million/uL      Hemoglobin 14.9 g/dL      Hematocrit 44.1 %      MCV 91 fL      MCH 30.6 pg      MCHC 33.8 g/dL      RDW 11.7 %      MPV 9.2 fL      Platelets 949 Thousands/uL      nRBC 0 /100 WBCs      Neutrophils Relative 67 %      Immat GRANS % 0 %      Lymphocytes Relative 22 %      Monocytes Relative 6 %      Eosinophils Relative 4 %      Basophils Relative 1 %      Neutrophils Absolute 6.00 Thousands/µL      Immature Grans Absolute 0.04 Thousand/uL      Lymphocytes Absolute 1.99 Thousands/µL      Monocytes Absolute 0.51 Thousand/µL      Eosinophils Absolute 0.40 Thousand/µL      Basophils Absolute 0.09 Thousands/µL                  CT abdomen pelvis with contrast   Final Result by Rupali Chavez MD (08/05 2358)      No evidence of acute abnormality in the abdomen or pelvis. Workstation performed: [id number]                    Procedures  Procedures         ED Course  ED Course as of 08/06/23 0009   Sat Aug 05, 2023   2156 Hemoglobin: 14.9  Baseline     Sun Aug 06, 2023   0003 Discussed with patient that CT AP and labs are WNL. Reviewed possible internal hemorrhoidal bleeding. Recommend FU with GI. Reviewed strict RTED precautions which patient verbalized understanding of. SBIRT 22yo+    Flowsheet Row Most Recent Value   Initial Alcohol Screen: US AUDIT-C     1. How often do you have a drink containing alcohol? 0 Filed at: 08/05/2023 2106   2. How many drinks containing alcohol do you have on a typical day you are drinking?   0 Filed at: 08/05/2023 2106 3a. Male UNDER 65: How often do you have five or more drinks on one occasion? 0 Filed at: 08/05/2023 2106   3b. FEMALE Any Age, or MALE 65+: How often do you have 4 or more drinks on one occassion? 0 Filed at: 08/05/2023 2106   Audit-C Score 0 Filed at: 08/05/2023 2106   OSIRIS: How many times in the past year have you. .. Used an illegal drug or used a prescription medication for non-medical reasons? Never Filed at: 08/05/2023 2106                    Medical Decision Making  Assessment and plan:  Differential includes hemorrhoidal bleeding versus diverticular bleeding. Will check labs to evaluate for anemia, leukocytosis, kidney and liver function; lipase to rule out pancreatitis; CT scan of the abdomen pelvis to assess for the aforementioned differential.    Amount and/or Complexity of Data Reviewed  Labs: ordered. Decision-making details documented in ED Course. Radiology: ordered. Disposition  Final diagnoses:   Blood in stool     Time reflects when diagnosis was documented in both MDM as applicable and the Disposition within this note     Time User Action Codes Description Comment    8/6/2023 12:04 AM Mathew Schmid Add [K92.1] Blood in stool       ED Disposition     ED Disposition   Discharge    Condition   Stable    Date/Time   Sun Aug 6, 2023 12:02 AM    Comment   [place] Brandan discharge to home/self care.                Follow-up Information     Follow up With Specialties Details Why Contact Info Additional Information    Lance Guzman DO Family Medicine Schedule an appointment as soon as possible for a visit in 3 days for re-evaluation [address] [facility] Schedule an appointment as soon as possible for a visit in 1 week for re-evaluation [address] [facility] [place], [place] [address], [facility]  [phone]     [address] [facility] Emergency Medicine Go to  As needed, If symptoms worsen, for re-evaluation [address] [facility] [address] [phone] 27[address] [facility], [address] [facility], [address] [place] [address] [address]          Patient's Medications   Discharge Prescriptions    No medications on file           PDMP Review     None          ED Provider  Electronically Signed by           Myha Kang DO  08/06/23 0009

## 2023-08-06 NOTE — PROGRESS NOTES
[facility]        NAME: Jose Bermudez is a 34 y.o. male  : 1990    MRN: [id number]  DATE: 2023  TIME: 4:59 PM    Assessment and Plan   BRBPR (bright red blood per rectum) [K62.5]  1. BRBPR (bright red blood per rectum)  Transfer to other facility        Due to the patient's recent development of bloody stools, without pain or other symptom that may point to a diagnosis of external hemorrhoids or fissure, I recommend the patient be further evaluated at the ED. The patient verbalized understanding, and feels comfortable driving himself. Patient Instructions       Follow up with PCP in 3-5 days. Proceed to  ER if symptoms worsen. Chief Complaint     Chief Complaint   Patient presents with   • Rectal Bleeding     Pt states a week ago fruday went out to dinner and ate [place] food, which believes caused GI upset. Pt reports frequent soft stools changing in color with bright red stool noted last night, this morning and again today. History of Present Illness       27-year-old male presents the urgent care for evaluation of bloody stool. Patient states that about a week ago he developed some loose stools. Around the same time he also noted some bright red blood in his stools and on the toilet paper. Patient reports that recently he has also noticed clots in the toilet bowl. The patient denies any prior history of rectal bleeding. He denies any significant abdominal history, or history of hemorrhoids or fissures. The patient denies any pain with bowel movements, or anal itching. He does admit to some nausea but denies any abdominal pain, vomiting. The patient denies any fever or chills. He has not spoken to any other medical provider regarding his symptoms. He denies any chest pain, shortness of breath, dizziness, lightheadedness, headache. Review of Systems   Review of Systems   Constitutional: Negative for chills and fever.    HENT: Negative for ear pain, postnasal drip and sore throat. Eyes: Negative for pain and visual disturbance. Respiratory: Negative for cough and shortness of breath. Cardiovascular: Negative for chest pain and palpitations. Gastrointestinal: Positive for blood in stool. Negative for abdominal pain, constipation, diarrhea, nausea, rectal pain and vomiting. Genitourinary: Negative for dysuria, flank pain, hematuria, penile pain, penile swelling, scrotal swelling, testicular pain and urgency. Musculoskeletal: Negative for arthralgias and back pain. Skin: Negative for color change and rash. Neurological: Negative for dizziness, seizures, syncope, light-headedness and headaches. Psychiatric/Behavioral: Negative. All other systems reviewed and are negative. Current Medications       Current Outpatient Medications:   •  methylPREDNISolone 4 MG tablet therapy pack, Use as directed on package (Patient not taking: Reported on 8/5/2023), Disp: 1 each, Rfl: 0  •  neomycin-polymyxin-hydrocortisone (CORTISPORIN) otic solution, Administer 3 drops into the left ear every 6 (six) hours for 5 days, Disp: 10 mL, Rfl: 0  No current facility-administered medications for this visit. Current Allergies     Allergies as of 08/05/2023   • (No Known Allergies)            The following portions of the patient's history were reviewed and updated as appropriate: allergies, current medications, past family history, past medical history, past social history, past surgical history and problem list.     Past Medical History:   Diagnosis Date   • Denial        No past surgical history on file. Family History   Problem Relation Age of Onset   • Thyroid disease Mother    • Hypertension Father    • Thyroid disease Brother          Medications have been verified.         Objective   /70 (BP Location: Right arm, Patient Position: Sitting, Cuff Size: Standard)   Pulse 75   Temp 97.5 °F (36.4 °C) (Tympanic)   Resp 18   SpO2 99% Physical Exam     Physical Exam  Vitals and nursing note reviewed. Constitutional:       General: He is not in acute distress. Appearance: Normal appearance. He is normal weight. He is not ill-appearing. HENT:      Head: Normocephalic and atraumatic. Mouth/Throat:      Pharynx: Oropharynx is clear. Eyes:      Extraocular Movements: Extraocular movements intact. Pupils: Pupils are equal, round, and reactive to light. Cardiovascular:      Rate and Rhythm: Normal rate and regular rhythm. Pulses: Normal pulses. Heart sounds: Normal heart sounds. Pulmonary:      Effort: Pulmonary effort is normal. No respiratory distress. Breath sounds: Normal breath sounds. Abdominal:      General: Abdomen is flat. Musculoskeletal:         General: Normal range of motion. Cervical back: Normal range of motion. Skin:     General: Skin is warm and dry. Capillary Refill: Capillary refill takes less than 2 seconds. Neurological:      General: No focal deficit present. Mental Status: He is alert and oriented to person, place, and time.    Psychiatric:         Mood and Affect: Mood normal.         Behavior: Behavior normal.

## 2023-08-09 ENCOUNTER — CONSULT (OUTPATIENT)
Dept: GASTROENTEROLOGY | Facility: MEDICAL CENTER | Age: 33
End: 2023-08-09
Payer: COMMERCIAL

## 2023-08-09 VITALS
WEIGHT: 185 LBS | BODY MASS INDEX: 24.52 KG/M2 | SYSTOLIC BLOOD PRESSURE: 128 MMHG | HEART RATE: 72 BPM | DIASTOLIC BLOOD PRESSURE: 64 MMHG | HEIGHT: 73 IN | TEMPERATURE: 98.1 F

## 2023-08-09 DIAGNOSIS — A09 DIARRHEA OF INFECTIOUS ORIGIN: Primary | ICD-10-CM

## 2023-08-09 DIAGNOSIS — K92.1 BLOOD IN STOOL: ICD-10-CM

## 2023-08-09 PROCEDURE — 99243 OFF/OP CNSLTJ NEW/EST LOW 30: CPT | Performed by: STUDENT IN AN ORGANIZED HEALTH CARE EDUCATION/TRAINING PROGRAM

## 2023-08-09 NOTE — PROGRESS NOTES
[facility] - Outpatient Consultation  Sae Schwarz 34 y.o. male MRN: [id number]  Encounter: [id number]      Assessment and Plan:    1. Diarrhea of infectious origin    2. Blood in stool        34 y.o. male  without significant medical history was referred to GI for acute onset diarrhea, nausea, abdominal discomfort, and hematochezia x 2 weeks. Given the acuity of his symptoms after a recent trip to [place], I think the most likely etiology is infectious diarrhea. IBD is unlikely for this reason. I will check stool infectious studies fecal calprotectin. I recommended fiber supplementation. If symptoms persist despite negative workup, we can discuss further evaluation with colonoscopy. We can discuss the utility of colonoscopy in the setting of hematochezia and family history of colon cancer in his father at a young age, but I will defer until we see how his symptoms evolve over time. - Fiber supplementation  - Stool infectious studies  - Fecal calprotectin    RTC in 2-3 months    Orders Placed This Encounter   Procedures   • Calprotectin,Fecal   • Stool Enteric Bacterial Panel by PCR   • Ova and parasite examination   • Clostridium difficile toxin by PCR     ______________________________________________________________________    History of Present Illness:    Sae Schwarz is a 34 y.o. male  without significant medical history was referred to GI for acute onset diarrhea, nausea, abdominal discomfort, and hematochezia x 2 weeks. Patient reports going to [place] 7/28/2023 and had [place] barbecue. He reports new stool urgency which progressed to diarrhea, abdominal discomfort, and nausea without vomiting shortly afterwards. Just this past weekend, he reports blood on the toilet paper and passing some clots.   This has since resolved although other symptoms persist.    He went to the ER 8/5/2023 where labs including CBC, CMP, lipase were normal.  CT A/P was also normal.    No prior colonoscopy. His father had colon cancer at age 62. No family history of IBD. Review of Systems:  As per HPI. Otherwise negative. Historical Information   Past Medical History:   Diagnosis Date   • Denial      History reviewed. No pertinent surgical history. Social History   Social History     Substance and Sexual Activity   Alcohol Use Not Currently     Social History     Substance and Sexual Activity   Drug Use Never     Social History     Tobacco Use   Smoking Status Never   Smokeless Tobacco Never     Family History   Problem Relation Age of Onset   • Thyroid disease Mother    • Hypertension Father    • Colon cancer Father    • Thyroid disease Brother        Meds/Allergies       Current Outpatient Medications:   •  methylPREDNISolone 4 MG tablet therapy pack  •  neomycin-polymyxin-hydrocortisone (CORTISPORIN) otic solution    No Known Allergies        Objective     Blood pressure 128/64, pulse 72, temperature 98.1 °F (36.7 °C), temperature source Tympanic, height 6' 1" (1.854 m), weight 83.9 kg (185 lb). Body mass index is 24.41 kg/m².         Physical Exam:      General: No acute distress  Abdomen: Soft, non-tender, non-distended, normoactive bowel sounds  Extremities: No lower extremity edema  Neuro: Awake, alert, oriented x 3    Lab Results:   Lab Results   Component Value Date/Time    WBC 9.03 08/05/2023 09:06 PM    HGB 14.9 08/05/2023 09:06 PM     08/05/2023 09:06 PM    SODIUM 138 08/05/2023 09:06 PM    SODIUM 139 08/13/2022 08:33 AM    K 4.2 08/05/2023 09:06 PM    K 4.5 08/13/2022 08:33 AM     08/05/2023 09:06 PM     08/13/2022 08:33 AM    CO2 28 08/05/2023 09:06 PM    CO2 30 08/13/2022 08:33 AM    BUN 14 08/05/2023 09:06 PM    BUN 13 08/13/2022 08:33 AM    CREATININE 0.96 08/05/2023 09:06 PM    AST 20 08/05/2023 09:06 PM    AST 16 08/13/2022 08:33 AM    ALT 34 08/05/2023 09:06 PM    ALT 18 08/13/2022 08:33 AM    ALKPHOS 80 08/05/2023 09:06 PM ALKPHOS 70 08/13/2022 08:33 AM    TBILI 0.39 08/05/2023 09:06 PM    TBILI 0.7 08/13/2022 08:33 AM    ALB 4.6 08/05/2023 09:06 PM    INR 1.12 04/19/2019 12:11 AM    LIPASE 35 08/05/2023 09:06 PM

## 2023-08-09 NOTE — PATIENT INSTRUCTIONS
- Start taking fiber supplementation (Metamucil, Citrucel, Benefiber, etc). Please mix one heaping tablespoon with an 8 ounce glass of water and drink every morning. This can be increased to twice per day if needed. Fiber tablets and gummies will also work. The goal is to have regular, formed, and easy-to-pass bowel movements. The effect is gradual, so please use it consistently for at least 2 weeks. Stay well-hydrated to avoid constipation.

## 2023-08-11 ENCOUNTER — APPOINTMENT (OUTPATIENT)
Dept: LAB | Facility: MEDICAL CENTER | Age: 33
End: 2023-08-11
Payer: COMMERCIAL

## 2023-08-11 DIAGNOSIS — A09 DIARRHEA OF INFECTIOUS ORIGIN: ICD-10-CM

## 2023-08-11 DIAGNOSIS — K92.1 BLOOD IN STOOL: ICD-10-CM

## 2023-08-11 PROCEDURE — 87209 SMEAR COMPLEX STAIN: CPT

## 2023-08-11 PROCEDURE — 87177 OVA AND PARASITES SMEARS: CPT

## 2023-08-11 PROCEDURE — 83993 ASSAY FOR CALPROTECTIN FECAL: CPT

## 2023-08-11 PROCEDURE — 87493 C DIFF AMPLIFIED PROBE: CPT

## 2023-08-11 PROCEDURE — 87505 NFCT AGENT DETECTION GI: CPT

## 2023-08-12 LAB
C DIFF TOX GENS STL QL NAA+PROBE: NEGATIVE
CAMPYLOBACTER DNA SPEC NAA+PROBE: NORMAL
SALMONELLA DNA SPEC QL NAA+PROBE: NORMAL
SHIGA TOXIN STX GENE SPEC NAA+PROBE: NORMAL
SHIGELLA DNA SPEC QL NAA+PROBE: NORMAL

## 2023-08-13 LAB — CALPROTECTIN STL-MCNT: 83 UG/G (ref 0–120)

## 2023-08-23 ENCOUNTER — TELEPHONE (OUTPATIENT)
Dept: GASTROENTEROLOGY | Facility: MEDICAL CENTER | Age: 33
End: 2023-08-23

## 2023-08-23 NOTE — TELEPHONE ENCOUNTER
Called patient and spoke with him over the phone to update him on recent stool studies. Stool enteric pathogen PCR, C. difficile, and O&P are negative. Fecal calprotectin is borderline at 83. Patient reports that symptoms slowly seem to be improving with more solid stool intermixed with his episodes of looser stools. He is taking fiber supplements. No further hematochezia. I did explain that an infectious process (which is most likely by history) can also increase the fecal calprotectin. He prefers a more conservative approach, so we will follow-up at her next office visit and repeat the fecal calprotectin at that time. If his symptoms have resolved, and fecal calprotectin has normalized, we will presume that he had infectious process and forego colonoscopy.

## 2023-10-16 ENCOUNTER — OFFICE VISIT (OUTPATIENT)
Dept: GASTROENTEROLOGY | Facility: MEDICAL CENTER | Age: 33
End: 2023-10-16
Payer: COMMERCIAL

## 2023-10-16 ENCOUNTER — TELEPHONE (OUTPATIENT)
Dept: GASTROENTEROLOGY | Facility: MEDICAL CENTER | Age: 33
End: 2023-10-16

## 2023-10-16 VITALS
HEIGHT: 73 IN | DIASTOLIC BLOOD PRESSURE: 64 MMHG | SYSTOLIC BLOOD PRESSURE: 142 MMHG | HEART RATE: 94 BPM | WEIGHT: 187.5 LBS | TEMPERATURE: 98.1 F | BODY MASS INDEX: 24.85 KG/M2

## 2023-10-16 DIAGNOSIS — K92.1 HEMATOCHEZIA: Primary | ICD-10-CM

## 2023-10-16 DIAGNOSIS — R19.7 DIARRHEA, UNSPECIFIED TYPE: ICD-10-CM

## 2023-10-16 PROCEDURE — 99214 OFFICE O/P EST MOD 30 MIN: CPT | Performed by: STUDENT IN AN ORGANIZED HEALTH CARE EDUCATION/TRAINING PROGRAM

## 2023-10-16 RX ORDER — POLYETHYLENE GLYCOL 3350, SODIUM CHLORIDE, SODIUM BICARBONATE, POTASSIUM CHLORIDE 420; 11.2; 5.72; 1.48 G/4L; G/4L; G/4L; G/4L
4000 POWDER, FOR SOLUTION ORAL ONCE
Qty: 4000 ML | Refills: 0 | Status: SHIPPED | OUTPATIENT
Start: 2023-10-16 | End: 2023-10-16

## 2023-10-16 NOTE — H&P (VIEW-ONLY)
[facility] - Outpatient Consultation  Glenys Leija 34 y.o. male MRN: [id number]  Encounter: [id number]      Assessment and Plan:    1. Hematochezia    2. Diarrhea, unspecified type        34 y.o. male  without significant medical history who presents for follow-up of acute onset diarrhea, abdominal discomfort, and hematochezia starting in 7/2023. The acuity of his symptoms after a recent trip to [place] was most concerning for an infectious etiology, and given his symptomatic improvement, the plan was conservative management with repeat fecal calprotectin after an initial borderline level. However, he now has recurrent and persistent symptoms, so I have recommended that he pursue colonoscopy, especially given the history of colon cancer in his father at a young age. - Colonoscopy, [facility] in 2-3 months    Orders Placed This Encounter   Procedures    Colonoscopy     ______________________________________________________________________    History of Present Illness:    Glenys Leija is a 34 y.o. male  without significant medical history who presents for follow-up of acute onset diarrhea, abdominal discomfort, and hematochezia starting in 7/2023. Patient went to [place] 7/28/2023 and had [place] barbecue. He reports new stool urgency which progressed to diarrhea, abdominal discomfort, and nausea without vomiting shortly afterwards. He then had hematochezia with clots which seemed to resolve. He went to the ER 8/5/2023 where labs including CBC, CMP, lipase were normal.  CT A/P was also normal.    At our last visit 8/2023, it was deemed most likely that he has an infectious diarrhea given acuity of symptoms. Stool infectious studies including C. difficile, stool enteric pathogen PCR, and O&P were negative. Fecal calprotectin was borderline at 83.  We discussed possible colonoscopy over the phone, but he felt that symptoms were starting to improve, so this was deferred. Today, he reports recurrence of loose stools (3-4 BM/day) and hematochezia starting at the beginning of this month. Minimal abdominal discomfort. No prior colonoscopy. His father had colon cancer at age 62. No family history of IBD. Review of Systems:  As per HPI. Otherwise negative. Historical Information   Past Medical History:   Diagnosis Date    Denial      History reviewed. No pertinent surgical history. Social History   Social History     Substance and Sexual Activity   Alcohol Use Not Currently     Social History     Substance and Sexual Activity   Drug Use Never     Social History     Tobacco Use   Smoking Status Never   Smokeless Tobacco Never     Family History   Problem Relation Age of Onset    Thyroid disease Mother     Colon polyps Mother     Hypertension Father     Colon cancer Father     Thyroid disease Brother        Meds/Allergies       Current Outpatient Medications:     polyethylene glycol-electrolytes (TriLyte) 4000 mL solution    methylPREDNISolone 4 MG tablet therapy pack    neomycin-polymyxin-hydrocortisone (CORTISPORIN) otic solution    No Known Allergies        Objective     Blood pressure 142/64, pulse 94, temperature 98.1 °F (36.7 °C), temperature source Tympanic, height 6' 1" (1.854 m), weight 85 kg (187 lb 8 oz). Body mass index is 24.74 kg/m².         Physical Exam:      General: No acute distress  Abdomen: Soft, non-tender, non-distended, normoactive bowel sounds  Extremities: No lower extremity edema  Neuro: Awake, alert, oriented x 3    Lab Results:   Lab Results   Component Value Date/Time    WBC 9.03 08/05/2023 09:06 PM    HGB 14.9 08/05/2023 09:06 PM     08/05/2023 09:06 PM    SODIUM 138 08/05/2023 09:06 PM    SODIUM 139 08/13/2022 08:33 AM    K 4.2 08/05/2023 09:06 PM    K 4.5 08/13/2022 08:33 AM     08/05/2023 09:06 PM     08/13/2022 08:33 AM    CO2 28 08/05/2023 09:06 PM    CO2 30 08/13/2022 08:33 AM    BUN 14 08/05/2023 09:06 PM    BUN 13 08/13/2022 08:33 AM    CREATININE 0.96 08/05/2023 09:06 PM    AST 20 08/05/2023 09:06 PM    AST 16 08/13/2022 08:33 AM    ALT 34 08/05/2023 09:06 PM    ALT 18 08/13/2022 08:33 AM    ALKPHOS 80 08/05/2023 09:06 PM    ALKPHOS 70 08/13/2022 08:33 AM    TBILI 0.39 08/05/2023 09:06 PM    TBILI 0.7 08/13/2022 08:33 AM    ALB 4.6 08/05/2023 09:06 PM    INR 1.12 04/19/2019 12:11 AM    LIPASE 35 08/05/2023 09:06 PM

## 2023-10-16 NOTE — PROGRESS NOTES
[facility] - Outpatient Consultation  Carloz Lindsay 34 y.o. male MRN: [id number]  Encounter: [id number]      Assessment and Plan:    1. Hematochezia    2. Diarrhea, unspecified type        34 y.o. male  without significant medical history who presents for follow-up of acute onset diarrhea, abdominal discomfort, and hematochezia starting in 7/2023. The acuity of his symptoms after a recent trip to [place] was most concerning for an infectious etiology, and given his symptomatic improvement, the plan was conservative management with repeat fecal calprotectin after an initial borderline level. However, he now has recurrent and persistent symptoms, so I have recommended that he pursue colonoscopy, especially given the history of colon cancer in his father at a young age. - Colonoscopy, [facility] in 2-3 months    Orders Placed This Encounter   Procedures    Colonoscopy     ______________________________________________________________________    History of Present Illness:    Carloz Lindsay is a 34 y.o. male  without significant medical history who presents for follow-up of acute onset diarrhea, abdominal discomfort, and hematochezia starting in 7/2023. Patient went to [place] 7/28/2023 and had [place] barbecue. He reports new stool urgency which progressed to diarrhea, abdominal discomfort, and nausea without vomiting shortly afterwards. He then had hematochezia with clots which seemed to resolve. He went to the ER 8/5/2023 where labs including CBC, CMP, lipase were normal.  CT A/P was also normal.    At our last visit 8/2023, it was deemed most likely that he has an infectious diarrhea given acuity of symptoms. Stool infectious studies including C. difficile, stool enteric pathogen PCR, and O&P were negative. Fecal calprotectin was borderline at 83.  We discussed possible colonoscopy over the phone, but he felt that symptoms were starting to improve, so this was deferred. Today, he reports recurrence of loose stools (3-4 BM/day) and hematochezia starting at the beginning of this month. Minimal abdominal discomfort. No prior colonoscopy. His father had colon cancer at age 62. No family history of IBD. Review of Systems:  As per HPI. Otherwise negative. Historical Information   Past Medical History:   Diagnosis Date    Denial      History reviewed. No pertinent surgical history. Social History   Social History     Substance and Sexual Activity   Alcohol Use Not Currently     Social History     Substance and Sexual Activity   Drug Use Never     Social History     Tobacco Use   Smoking Status Never   Smokeless Tobacco Never     Family History   Problem Relation Age of Onset    Thyroid disease Mother     Colon polyps Mother     Hypertension Father     Colon cancer Father     Thyroid disease Brother        Meds/Allergies       Current Outpatient Medications:     polyethylene glycol-electrolytes (TriLyte) 4000 mL solution    methylPREDNISolone 4 MG tablet therapy pack    neomycin-polymyxin-hydrocortisone (CORTISPORIN) otic solution    No Known Allergies        Objective     Blood pressure 142/64, pulse 94, temperature 98.1 °F (36.7 °C), temperature source Tympanic, height 6' 1" (1.854 m), weight 85 kg (187 lb 8 oz). Body mass index is 24.74 kg/m².         Physical Exam:      General: No acute distress  Abdomen: Soft, non-tender, non-distended, normoactive bowel sounds  Extremities: No lower extremity edema  Neuro: Awake, alert, oriented x 3    Lab Results:   Lab Results   Component Value Date/Time    WBC 9.03 08/05/2023 09:06 PM    HGB 14.9 08/05/2023 09:06 PM     08/05/2023 09:06 PM    SODIUM 138 08/05/2023 09:06 PM    SODIUM 139 08/13/2022 08:33 AM    K 4.2 08/05/2023 09:06 PM    K 4.5 08/13/2022 08:33 AM     08/05/2023 09:06 PM     08/13/2022 08:33 AM    CO2 28 08/05/2023 09:06 PM    CO2 30 08/13/2022 08:33 AM    BUN 14 08/05/2023 09:06 PM    BUN 13 08/13/2022 08:33 AM    CREATININE 0.96 08/05/2023 09:06 PM    AST 20 08/05/2023 09:06 PM    AST 16 08/13/2022 08:33 AM    ALT 34 08/05/2023 09:06 PM    ALT 18 08/13/2022 08:33 AM    ALKPHOS 80 08/05/2023 09:06 PM    ALKPHOS 70 08/13/2022 08:33 AM    TBILI 0.39 08/05/2023 09:06 PM    TBILI 0.7 08/13/2022 08:33 AM    ALB 4.6 08/05/2023 09:06 PM    INR 1.12 04/19/2019 12:11 AM    LIPASE 35 08/05/2023 09:06 PM

## 2023-10-17 ENCOUNTER — ANESTHESIA (OUTPATIENT)
Dept: ANESTHESIOLOGY | Facility: HOSPITAL | Age: 33
End: 2023-10-17

## 2023-10-17 ENCOUNTER — ANESTHESIA EVENT (OUTPATIENT)
Dept: ANESTHESIOLOGY | Facility: HOSPITAL | Age: 33
End: 2023-10-17

## 2023-10-20 ENCOUNTER — TELEPHONE (OUTPATIENT)
Dept: GASTROENTEROLOGY | Facility: MEDICAL CENTER | Age: 33
End: 2023-10-20

## 2023-10-20 NOTE — TELEPHONE ENCOUNTER
Spoke with patient to confirm 10/30 procedure. Patient stated has prep instructions and no questions at this time.

## 2023-10-26 RX ORDER — SODIUM CHLORIDE 9 MG/ML
125 INJECTION, SOLUTION INTRAVENOUS CONTINUOUS
Status: CANCELLED | OUTPATIENT
Start: 2023-10-26

## 2023-10-30 ENCOUNTER — ANESTHESIA (OUTPATIENT)
Dept: GASTROENTEROLOGY | Facility: MEDICAL CENTER | Age: 33
End: 2023-10-30

## 2023-10-30 ENCOUNTER — ANESTHESIA EVENT (OUTPATIENT)
Dept: GASTROENTEROLOGY | Facility: MEDICAL CENTER | Age: 33
End: 2023-10-30

## 2023-10-30 ENCOUNTER — HOSPITAL ENCOUNTER (OUTPATIENT)
Dept: GASTROENTEROLOGY | Facility: MEDICAL CENTER | Age: 33
Setting detail: OUTPATIENT SURGERY
Discharge: HOME | End: 2023-10-30
Payer: COMMERCIAL

## 2023-10-30 VITALS
DIASTOLIC BLOOD PRESSURE: 76 MMHG | TEMPERATURE: 98.3 F | SYSTOLIC BLOOD PRESSURE: 125 MMHG | RESPIRATION RATE: 20 BRPM | OXYGEN SATURATION: 99 % | HEART RATE: 91 BPM

## 2023-10-30 DIAGNOSIS — K51.20 ULCERATIVE PROCTITIS WITHOUT COMPLICATION (HCC): Primary | ICD-10-CM

## 2023-10-30 DIAGNOSIS — R19.7 DIARRHEA, UNSPECIFIED TYPE: ICD-10-CM

## 2023-10-30 DIAGNOSIS — K92.1 HEMATOCHEZIA: ICD-10-CM

## 2023-10-30 PROCEDURE — 45380 COLONOSCOPY AND BIOPSY: CPT | Performed by: STUDENT IN AN ORGANIZED HEALTH CARE EDUCATION/TRAINING PROGRAM

## 2023-10-30 PROCEDURE — 88305 TISSUE EXAM BY PATHOLOGIST: CPT | Performed by: PATHOLOGY

## 2023-10-30 RX ORDER — PROPOFOL 10 MG/ML
INJECTION, EMULSION INTRAVENOUS AS NEEDED
Status: DISCONTINUED | OUTPATIENT
Start: 2023-10-30 | End: 2023-10-30

## 2023-10-30 RX ORDER — LIDOCAINE HYDROCHLORIDE 20 MG/ML
INJECTION, SOLUTION EPIDURAL; INFILTRATION; INTRACAUDAL; PERINEURAL AS NEEDED
Status: DISCONTINUED | OUTPATIENT
Start: 2023-10-30 | End: 2023-10-30

## 2023-10-30 RX ORDER — MESALAMINE 4 G/60ML
4 SUSPENSION RECTAL
Qty: 30 ENEMA | Refills: 2 | Status: SHIPPED | OUTPATIENT
Start: 2023-10-30

## 2023-10-30 RX ORDER — PROPOFOL 10 MG/ML
INJECTION, EMULSION INTRAVENOUS CONTINUOUS PRN
Status: DISCONTINUED | OUTPATIENT
Start: 2023-10-30 | End: 2023-10-30

## 2023-10-30 RX ORDER — SODIUM CHLORIDE 9 MG/ML
125 INJECTION, SOLUTION INTRAVENOUS CONTINUOUS
Status: DISCONTINUED | OUTPATIENT
Start: 2023-10-30 | End: 2023-11-03 | Stop reason: HOSPADM

## 2023-10-30 RX ADMIN — LIDOCAINE HYDROCHLORIDE 100 MG: 20 INJECTION, SOLUTION EPIDURAL; INFILTRATION; INTRACAUDAL at 10:37

## 2023-10-30 RX ADMIN — SODIUM CHLORIDE 125 ML/HR: 0.9 INJECTION, SOLUTION INTRAVENOUS at 10:04

## 2023-10-30 RX ADMIN — PROPOFOL 150 MG: 10 INJECTION, EMULSION INTRAVENOUS at 10:37

## 2023-10-30 RX ADMIN — PROPOFOL 180 MCG/KG/MIN: 10 INJECTION, EMULSION INTRAVENOUS at 10:37

## 2023-10-30 NOTE — ANESTHESIA POSTPROCEDURE EVALUATION
Post-Op Assessment Note    CV Status:  Stable    Pain management: adequate     Mental Status:  Alert and awake   Hydration Status:  Euvolemic   PONV Controlled:  Controlled   Airway Patency:  Patent      Post Op Vitals Reviewed: Yes      Staff: Anesthesiologist         No notable events documented.     /64 (10/30/23 1103)    Temp      Pulse 88 (10/30/23 1103)   Resp 22 (10/30/23 1103)    SpO2 97 % (10/30/23 1103)

## 2023-10-30 NOTE — ANESTHESIA PREPROCEDURE EVALUATION
Procedure:  COLONOSCOPY    Relevant Problems   NEURO/PSYCH   (+) Post-traumatic headache, not intractable      Other   (+) Cognitive communication deficit        Physical Exam    Airway    Mallampati score: I  TM Distance: >3 FB  Neck ROM: full     Dental       Cardiovascular  Cardiovascular exam normal    Pulmonary  Pulmonary exam normal     Other Findings        Anesthesia Plan  ASA Score- 2     Anesthesia Type- IV sedation with anesthesia with ASA Monitors. Additional Monitors:     Airway Plan:            Plan Factors-Exercise tolerance (METS): >4 METS. Chart reviewed. Existing labs reviewed. Patient summary reviewed. Patient is not a current smoker. Induction- intravenous. Postoperative Plan-     Informed Consent- Anesthetic plan and risks discussed with patient.

## 2023-10-30 NOTE — INTERVAL H&P NOTE
H&P reviewed. After examining the patient I find no changes in the patients condition since the H&P had been written.     Vitals:    10/30/23 0954   BP: 136/79   Pulse: 92   Resp: 18   Temp: 98.3 °F (36.8 °C)   SpO2: 100%

## 2023-11-03 PROCEDURE — 88305 TISSUE EXAM BY PATHOLOGIST: CPT | Performed by: PATHOLOGY

## 2023-11-03 NOTE — RESULT ENCOUNTER NOTE
Patient updated by [facility]. Biopsies consistent with mild ulcerative proctosigmoiditis. Should continue Rowasa enemas and follow-up in the GI office.

## 2024-01-23 ENCOUNTER — APPOINTMENT (OUTPATIENT)
Dept: LAB | Facility: MEDICAL CENTER | Age: 34
End: 2024-01-23

## 2024-01-23 ENCOUNTER — OFFICE VISIT (OUTPATIENT)
Dept: GASTROENTEROLOGY | Facility: MEDICAL CENTER | Age: 34
End: 2024-01-23
Payer: COMMERCIAL

## 2024-01-23 VITALS
BODY MASS INDEX: 25.58 KG/M2 | HEART RATE: 88 BPM | SYSTOLIC BLOOD PRESSURE: 132 MMHG | HEIGHT: 73 IN | TEMPERATURE: 97.9 F | WEIGHT: 193 LBS | DIASTOLIC BLOOD PRESSURE: 82 MMHG

## 2024-01-23 DIAGNOSIS — K51.30 ULCERATIVE RECTOSIGMOIDITIS WITHOUT COMPLICATION (HCC): ICD-10-CM

## 2024-01-23 DIAGNOSIS — K51.30 ULCERATIVE RECTOSIGMOIDITIS WITHOUT COMPLICATION (HCC): Primary | ICD-10-CM

## 2024-01-23 DIAGNOSIS — K51.20 ULCERATIVE PROCTITIS WITHOUT COMPLICATION (HCC): ICD-10-CM

## 2024-01-23 PROCEDURE — 99214 OFFICE O/P EST MOD 30 MIN: CPT | Performed by: STUDENT IN AN ORGANIZED HEALTH CARE EDUCATION/TRAINING PROGRAM

## 2024-01-23 RX ORDER — MESALAMINE 4 G/60ML
4 SUSPENSION RECTAL
Qty: 90 ENEMA | Refills: 3 | Status: SHIPPED | OUTPATIENT
Start: 2024-01-23

## 2024-01-23 NOTE — PROGRESS NOTES
[facility] - Outpatient Consultation  Osiel Henderson 33 y.o. male MRN: [id number]  Encounter: [id number]      Assessment and Plan:    1. Ulcerative rectosigmoiditis without complication (HCC)    2. Ulcerative proctitis without complication (HCC)        33 y.o. 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    Patient is in clinical remission with topical mesalamine. We discussed the disease course and the future possibility of requiring steroids or biologics. I will repeat labs and fecal calprotectin at this time. I will also plan for repeat colonoscopy in about 1 year (or earlier if symptoms recur).    Patient is at high risk for colon cancer given the family history of colon cancer in a first degree relative at a young age and UC. He should start surveillance colonoscopies at age 41 (8 years after UC diagnosis).    - Labs as below including fecal calprotectin  - Continue Rowasa enemas nightly  - Tentative plan for colonoscopy in 1 year    RTC in 6 months    Orders Placed This Encounter   Procedures    Calprotectin,Fecal    Vitamin D 25 hydroxy    Vitamin B12    Folate    C-reactive protein    Comprehensive metabolic panel    CBC and Platelet     ______________________________________________________________________    History of Present Illness:    Osiel Henderson is a 33 y.o. 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    Patient reported new stool urgency, diarrhea, abdominal discomfort, and hematochezia since 7/2023. FCP 8/2023 was borderline at 83. CT A/P and stool infectious studies were negative, but it was still felt to be related to acute infectious diarrhea.     Due to persistent symptoms, he underwent colonoscopy 10/2023 which showed mild erythema, edema, and erosions in the rectum and rectosigmoid (Gandhi 2) with path consistent with chronic  "proctosigmoiditis.    Patient was started on Rowasa enemas nightly. Today, he reports complete resolution of diarrhea and hematochezia.    His father had colon cancer at age 58.  No family history of IBD.      Review of Systems:  As per HPI. Otherwise negative.      Historical Information   Past Medical History:   Diagnosis Date    Denial      Past Surgical History:   Procedure Laterality Date    WISDOM TOOTH EXTRACTION       Social History   Social History     Substance and Sexual Activity   Alcohol Use Not Currently     Social History     Substance and Sexual Activity   Drug Use Never     Social History     Tobacco Use   Smoking Status Never   Smokeless Tobacco Never     Family History   Problem Relation Age of Onset    Thyroid disease Mother     Colon polyps Mother     Hypertension Father     Colon cancer Father     Thyroid disease Brother     Colon polyps Maternal Grandmother        Meds/Allergies       Current Outpatient Medications:     mesalamine (ROWASA) 4 g    methylPREDNISolone 4 MG tablet therapy pack    neomycin-polymyxin-hydrocortisone (CORTISPORIN) otic solution    No Known Allergies        Objective     Blood pressure 132/82, pulse 88, temperature 97.9 °F (36.6 °C), temperature source Tympanic, height 6' 1\" (1.854 m), weight 87.5 kg (193 lb). Body mass index is 25.46 kg/m².        Physical Exam:      General: No acute distress  Abdomen: Soft, non-tender, non-distended, normoactive bowel sounds  Extremities: No lower extremity edema  Neuro: Awake, alert, oriented x 3    Lab Results:   Lab Results   Component Value Date/Time    WBC 9.03 08/05/2023 09:06 PM    HGB 14.9 08/05/2023 09:06 PM     08/05/2023 09:06 PM    SODIUM 138 08/05/2023 09:06 PM    SODIUM 139 08/13/2022 08:33 AM    K 4.2 08/05/2023 09:06 PM    K 4.5 08/13/2022 08:33 AM     08/05/2023 09:06 PM     08/13/2022 08:33 AM    CO2 28 08/05/2023 09:06 PM    CO2 30 08/13/2022 08:33 AM    BUN 14 08/05/2023 09:06 PM    BUN 13 " 08/13/2022 08:33 AM    CREATININE 0.96 08/05/2023 09:06 PM    AST 20 08/05/2023 09:06 PM    AST 16 08/13/2022 08:33 AM    ALT 34 08/05/2023 09:06 PM    ALT 18 08/13/2022 08:33 AM    ALKPHOS 80 08/05/2023 09:06 PM    ALKPHOS 70 08/13/2022 08:33 AM    TBILI 0.39 08/05/2023 09:06 PM    TBILI 0.7 08/13/2022 08:33 AM    ALB 4.6 08/05/2023 09:06 PM    INR 1.12 04/19/2019 12:11 AM    LIPASE 35 08/05/2023 09:06 PM

## 2024-03-09 ENCOUNTER — OFFICE VISIT (OUTPATIENT)
Dept: URGENT CARE | Facility: CLINIC | Age: 34
End: 2024-03-09
Payer: COMMERCIAL

## 2024-03-09 VITALS
OXYGEN SATURATION: 98 % | HEART RATE: 130 BPM | SYSTOLIC BLOOD PRESSURE: 122 MMHG | DIASTOLIC BLOOD PRESSURE: 70 MMHG | RESPIRATION RATE: 18 BRPM | TEMPERATURE: 99.7 F

## 2024-03-09 DIAGNOSIS — J02.9 ACUTE PHARYNGITIS, UNSPECIFIED ETIOLOGY: Primary | ICD-10-CM

## 2024-03-09 LAB — S PYO AG THROAT QL: NEGATIVE

## 2024-03-09 PROCEDURE — 99213 OFFICE O/P EST LOW 20 MIN: CPT | Performed by: PHYSICIAN ASSISTANT

## 2024-03-09 PROCEDURE — 87880 STREP A ASSAY W/OPTIC: CPT | Performed by: PHYSICIAN ASSISTANT

## 2024-03-09 RX ORDER — AMOXICILLIN 500 MG/1
500 CAPSULE ORAL EVERY 8 HOURS SCHEDULED
Qty: 30 CAPSULE | Refills: 0 | Status: SHIPPED | OUTPATIENT
Start: 2024-03-09 | End: 2024-03-19

## 2024-03-09 NOTE — PROGRESS NOTES
[facility]        NAME: Osiel Henderson is a 33 y.o. male  : 1990    MRN: [id number]  DATE: 2024  TIME: 10:41 AM    /70   Pulse (!) 130   Temp 99.7 °F (37.6 °C)   Resp 18   SpO2 98%     Assessment and Plan   Acute pharyngitis, unspecified etiology [J02.9]  1. Acute pharyngitis, unspecified etiology  POCT rapid strepA    amoxicillin (AMOXIL) 500 mg capsule            Patient Instructions       Follow up with PCP in 3-5 days.  Proceed to  ER if symptoms worsen.    Chief Complaint     Chief Complaint   Patient presents with    Sore Throat     Patient has a sore throat, fever, body aches that started on Wednesday          History of Present Illness       Pt with fever sore throat congestion x 4 days     Sore Throat         Review of Systems   Review of Systems   Constitutional:  Positive for fatigue and fever.   HENT:  Positive for sore throat.    Eyes: Negative.    Respiratory: Negative.     Cardiovascular: Negative.    Gastrointestinal: Negative.    Endocrine: Negative.    Genitourinary: Negative.    Musculoskeletal:  Positive for myalgias.   Allergic/Immunologic: Negative.    Neurological: Negative.    Hematological: Negative.    Psychiatric/Behavioral: Negative.     All other systems reviewed and are negative.        Current Medications       Current Outpatient Medications:     amoxicillin (AMOXIL) 500 mg capsule, Take 1 capsule (500 mg total) by mouth every 8 (eight) hours for 10 days, Disp: 30 capsule, Rfl: 0    mesalamine (ROWASA) 4 g, Insert 60 mL (4 g total) into the rectum daily at bedtime, Disp: 90 enema, Rfl: 3    methylPREDNISolone 4 MG tablet therapy pack, Use as directed on package (Patient not taking: Reported on 2023), Disp: 1 each, Rfl: 0    neomycin-polymyxin-hydrocortisone (CORTISPORIN) otic solution, Administer 3 drops into the left ear every 6 (six) hours for 5 days, Disp: 10 mL, Rfl: 0    Current Allergies     Allergies as of 2024    (No Known  Allergies)            The following portions of the patient's history were reviewed and updated as appropriate: allergies, current medications, past family history, past medical history, past social history, past surgical history and problem list.     Past Medical History:   Diagnosis Date    Denial        Past Surgical History:   Procedure Laterality Date    WISDOM TOOTH EXTRACTION         Family History   Problem Relation Age of Onset    Thyroid disease Mother     Colon polyps Mother     Hypertension Father     Colon cancer Father     Thyroid disease Brother     Colon polyps Maternal Grandmother          Medications have been verified.        Objective   /70   Pulse (!) 130   Temp 99.7 °F (37.6 °C)   Resp 18   SpO2 98%        Physical Exam     Physical Exam  Vitals and nursing note reviewed.   Constitutional:       Appearance: He is well-developed and normal weight.   HENT:      Head: Normocephalic and atraumatic.      Right Ear: Tympanic membrane and ear canal normal.      Left Ear: Tympanic membrane and ear canal normal.      Nose: Rhinorrhea present.      Mouth/Throat:      Pharynx: Uvula midline. Posterior oropharyngeal erythema present. No oropharyngeal exudate.      Tonsils: No tonsillar exudate or tonsillar abscesses.   Eyes:      Conjunctiva/sclera: Conjunctivae normal.      Pupils: Pupils are equal, round, and reactive to light.   Cardiovascular:      Rate and Rhythm: Normal rate and regular rhythm.   Pulmonary:      Effort: Pulmonary effort is normal.      Breath sounds: Normal breath sounds.   Abdominal:      General: Bowel sounds are normal.      Palpations: Abdomen is soft.   Musculoskeletal:      Cervical back: Normal range of motion and neck supple.   Lymphadenopathy:      Cervical: Cervical adenopathy present.   Skin:     General: Skin is warm.   Neurological:      Mental Status: He is alert and oriented to person, place, and time.

## 2024-03-16 ENCOUNTER — APPOINTMENT (OUTPATIENT)
Dept: LAB | Facility: CLINIC | Age: 34
End: 2024-03-16
Payer: COMMERCIAL

## 2024-03-16 LAB
25(OH)D3 SERPL-MCNC: 47.9 NG/ML (ref 30–100)
ALBUMIN SERPL BCP-MCNC: 4.4 G/DL (ref 3.5–5)
ALP SERPL-CCNC: 66 U/L (ref 34–104)
ALT SERPL W P-5'-P-CCNC: 23 U/L (ref 7–52)
ANION GAP SERPL CALCULATED.3IONS-SCNC: 7 MMOL/L (ref 4–13)
AST SERPL W P-5'-P-CCNC: 19 U/L (ref 13–39)
BILIRUB SERPL-MCNC: 0.64 MG/DL (ref 0.2–1)
BUN SERPL-MCNC: 14 MG/DL (ref 5–25)
CALCIUM SERPL-MCNC: 9.3 MG/DL (ref 8.4–10.2)
CHLORIDE SERPL-SCNC: 104 MMOL/L (ref 96–108)
CO2 SERPL-SCNC: 29 MMOL/L (ref 21–32)
CREAT SERPL-MCNC: 0.95 MG/DL (ref 0.6–1.3)
CRP SERPL QL: <1 MG/L
ERYTHROCYTE [DISTWIDTH] IN BLOOD BY AUTOMATED COUNT: 11.3 % (ref 11.6–15.1)
FERRITIN SERPL-MCNC: 129 NG/ML (ref 24–336)
FOLATE SERPL-MCNC: 9.8 NG/ML
GFR SERPL CREATININE-BSD FRML MDRD: 106 ML/MIN/1.73SQ M
GLUCOSE P FAST SERPL-MCNC: 91 MG/DL (ref 65–99)
HCT VFR BLD AUTO: 45.7 % (ref 36.5–49.3)
HGB BLD-MCNC: 15.1 G/DL (ref 12–17)
IRON SATN MFR SERPL: 39 % (ref 15–50)
IRON SERPL-MCNC: 125 UG/DL (ref 50–212)
MCH RBC QN AUTO: 30 PG (ref 26.8–34.3)
MCHC RBC AUTO-ENTMCNC: 33 G/DL (ref 31.4–37.4)
MCV RBC AUTO: 91 FL (ref 82–98)
PLATELET # BLD AUTO: 328 THOUSANDS/UL (ref 149–390)
PMV BLD AUTO: 9.8 FL (ref 8.9–12.7)
POTASSIUM SERPL-SCNC: 4.1 MMOL/L (ref 3.5–5.3)
PROT SERPL-MCNC: 7.3 G/DL (ref 6.4–8.4)
RBC # BLD AUTO: 5.04 MILLION/UL (ref 3.88–5.62)
SODIUM SERPL-SCNC: 140 MMOL/L (ref 135–147)
TIBC SERPL-MCNC: 319 UG/DL (ref 250–450)
UIBC SERPL-MCNC: 194 UG/DL (ref 155–355)
VIT B12 SERPL-MCNC: 1179 PG/ML (ref 180–914)
WBC # BLD AUTO: 4.12 THOUSAND/UL (ref 4.31–10.16)

## 2024-03-19 LAB — CALPROTECTIN STL-MCNT: 15 UG/G (ref 0–120)

## 2024-03-21 ENCOUNTER — TELEPHONE (OUTPATIENT)
Dept: GASTROENTEROLOGY | Facility: CLINIC | Age: 34
End: 2024-03-21

## 2024-03-21 NOTE — TELEPHONE ENCOUNTER
Called patient to notify of provider schedule change and rescheduled office visit    Rescheduled appt

## 2024-09-24 ENCOUNTER — OFFICE VISIT (OUTPATIENT)
Dept: GASTROENTEROLOGY | Facility: MEDICAL CENTER | Age: 34
End: 2024-09-24
Payer: COMMERCIAL

## 2024-09-24 ENCOUNTER — TELEPHONE (OUTPATIENT)
Dept: GASTROENTEROLOGY | Facility: MEDICAL CENTER | Age: 34
End: 2024-09-24

## 2024-09-24 VITALS
TEMPERATURE: 96.1 F | WEIGHT: 197.2 LBS | BODY MASS INDEX: 26.14 KG/M2 | OXYGEN SATURATION: 98 % | HEIGHT: 73 IN | DIASTOLIC BLOOD PRESSURE: 76 MMHG | HEART RATE: 90 BPM | SYSTOLIC BLOOD PRESSURE: 138 MMHG

## 2024-09-24 DIAGNOSIS — K51.30 ULCERATIVE RECTOSIGMOIDITIS WITHOUT COMPLICATION (HCC): ICD-10-CM

## 2024-09-24 PROCEDURE — 99214 OFFICE O/P EST MOD 30 MIN: CPT | Performed by: STUDENT IN AN ORGANIZED HEALTH CARE EDUCATION/TRAINING PROGRAM

## 2024-09-24 RX ORDER — MESALAMINE 1.2 G/1
2400 TABLET, DELAYED RELEASE ORAL
Qty: 180 TABLET | Refills: 3 | Status: SHIPPED | OUTPATIENT
Start: 2024-09-24

## 2024-09-24 RX ORDER — MESALAMINE 4 G/60ML
4 SUSPENSION RECTAL
Start: 2024-09-24

## 2024-09-24 NOTE — TELEPHONE ENCOUNTER
Procedure: Colonoscopy  Date: 12/26/2024  Physician performing: Dr. Perez  Location of procedure:  [facility]  Instructions given to patient: Clenpiq  Diabetic: N/A  Clearances: N/A    Patient is placed on a recall for his follow up

## 2024-09-24 NOTE — PROGRESS NOTES
[facility] - Outpatient Consultation  Osiel Henderson 34 y.o. male MRN: [id number]  Encounter: [id number]      Assessment and Plan:    1. Ulcerative rectosigmoiditis without complication (HCC)        33 y.o. 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    Patient is in clinical remission with topical mesalamine, and given normalization of FCP, he will likely be in endoscopic remission as well. He is starting to find the Rowasa enemas burdensome, so we discussed switching him to oral mesalamine for maintenance. Risks and benefits were discussed.    I will update labs and obtain a repeat colonoscopy in 12/2024.    Patient is at high risk for colon cancer given the family history of colon cancer in a first degree relative at a young age and UC. He should start surveillance colonoscopies at age 41 (8 years after UC diagnosis).    - Labs as below  - Start Lialda 2.4 g daily  - Can change Rowasa enemas to nightly PRN  - Colonoscopy in 12/2024, Clenpiq    RTC in 6 months    Orders Placed This Encounter   Procedures    Comprehensive metabolic panel    CBC and Platelet    C-reactive protein    Colonoscopy     ______________________________________________________________________    History of Present Illness:    Osiel Henderson is a 34 y.o. 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    Patient reported new stool urgency, diarrhea, abdominal discomfort, and hematochezia since 7/2023. FCP 8/2023 was borderline at 83. CT A/P and stool infectious studies were negative, but it was still felt to be related to acute infectious diarrhea.     Due to persistent symptoms, he underwent colonoscopy 10/2023 which showed mild erythema, edema, and erosions in the rectum and rectosigmoid (Gandhi 2) with path consistent with chronic proctosigmoiditis.    Patient was started on Rowasa  "enemas nightly with complete resolution of diarrhea and hematochezia. FCP 15 and CRP neg in 3/2024.    His father had colon cancer at age 58.  No family history of IBD.    Today, patient continues to do well although he admits nightly enemas can be burdensome.    Review of Systems:  As per HPI. Otherwise negative.      Historical Information   Past Medical History:   Diagnosis Date    Denial      Past Surgical History:   Procedure Laterality Date    WISDOM TOOTH EXTRACTION       Social History   Social History     Substance and Sexual Activity   Alcohol Use Not Currently     Social History     Substance and Sexual Activity   Drug Use Never     Social History     Tobacco Use   Smoking Status Never   Smokeless Tobacco Never     Family History   Problem Relation Age of Onset    Thyroid disease Mother     Colon polyps Mother     Hypertension Father     Colon cancer Father     Thyroid disease Brother     Colon polyps Maternal Grandmother        Meds/Allergies       Current Outpatient Medications:     mesalamine (LIALDA) 1.2 g EC tablet    mesalamine (ROWASA) 4 g    sodium picosulfate, magnesium oxide, citric acid (Clenpiq) oral solution    methylPREDNISolone 4 MG tablet therapy pack    neomycin-polymyxin-hydrocortisone (CORTISPORIN) otic solution    No Known Allergies        Objective     Blood pressure 138/76, pulse 90, temperature (!) 96.1 °F (35.6 °C), temperature source Tympanic, height 6' 1\" (1.854 m), weight 89.4 kg (197 lb 3.2 oz), SpO2 98%. Body mass index is 26.02 kg/m².        Physical Exam:      General: No acute distress  Abdomen: Soft, non-tender, non-distended, normoactive bowel sounds  Extremities: No lower extremity edema  Neuro: Awake, alert, oriented x 3    Lab Results:   Lab Results   Component Value Date/Time    WBC 4.12 (L) 03/16/2024 08:51 AM    HGB 15.1 03/16/2024 08:51 AM     03/16/2024 08:51 AM    SODIUM 140 03/16/2024 08:51 AM    SODIUM 139 08/13/2022 08:33 AM    K 4.1 03/16/2024 08:51 AM "    K 4.5 08/13/2022 08:33 AM     03/16/2024 08:51 AM     08/13/2022 08:33 AM    CO2 29 03/16/2024 08:51 AM    CO2 30 08/13/2022 08:33 AM    BUN 14 03/16/2024 08:51 AM    BUN 13 08/13/2022 08:33 AM    CREATININE 0.95 03/16/2024 08:51 AM    AST 19 03/16/2024 08:51 AM    AST 16 08/13/2022 08:33 AM    ALT 23 03/16/2024 08:51 AM    ALT 18 08/13/2022 08:33 AM    ALKPHOS 66 03/16/2024 08:51 AM    ALKPHOS 70 08/13/2022 08:33 AM    TBILI 0.64 03/16/2024 08:51 AM    TBILI 0.7 08/13/2022 08:33 AM    ALB 4.4 03/16/2024 08:51 AM    INR 1.12 04/19/2019 12:11 AM    LIPASE 35 08/05/2023 09:06 PM    FERRITIN 129 03/16/2024 08:51 AM    CONCFE 39 03/16/2024 08:51 AM    TIBC 319 03/16/2024 08:51 AM

## 2024-09-27 ENCOUNTER — APPOINTMENT (OUTPATIENT)
Dept: LAB | Facility: CLINIC | Age: 34
End: 2024-09-27
Payer: COMMERCIAL

## 2024-09-27 DIAGNOSIS — K51.30 ULCERATIVE RECTOSIGMOIDITIS WITHOUT COMPLICATION (HCC): ICD-10-CM

## 2024-09-27 LAB
ALBUMIN SERPL BCG-MCNC: 4.7 G/DL (ref 3.5–5)
ALP SERPL-CCNC: 73 U/L (ref 34–104)
ALT SERPL W P-5'-P-CCNC: 33 U/L (ref 7–52)
ANION GAP SERPL CALCULATED.3IONS-SCNC: 10 MMOL/L (ref 4–13)
AST SERPL W P-5'-P-CCNC: 20 U/L (ref 13–39)
BILIRUB SERPL-MCNC: 0.64 MG/DL (ref 0.2–1)
BUN SERPL-MCNC: 16 MG/DL (ref 5–25)
CALCIUM SERPL-MCNC: 9.5 MG/DL (ref 8.4–10.2)
CHLORIDE SERPL-SCNC: 100 MMOL/L (ref 96–108)
CO2 SERPL-SCNC: 28 MMOL/L (ref 21–32)
CREAT SERPL-MCNC: 0.94 MG/DL (ref 0.6–1.3)
CRP SERPL QL: <1 MG/L
ERYTHROCYTE [DISTWIDTH] IN BLOOD BY AUTOMATED COUNT: 11.7 % (ref 11.6–15.1)
GFR SERPL CREATININE-BSD FRML MDRD: 106 ML/MIN/1.73SQ M
GLUCOSE SERPL-MCNC: 80 MG/DL (ref 65–140)
HCT VFR BLD AUTO: 45.8 % (ref 36.5–49.3)
HGB BLD-MCNC: 15.5 G/DL (ref 12–17)
MCH RBC QN AUTO: 30.3 PG (ref 26.8–34.3)
MCHC RBC AUTO-ENTMCNC: 33.8 G/DL (ref 31.4–37.4)
MCV RBC AUTO: 90 FL (ref 82–98)
PLATELET # BLD AUTO: 306 THOUSANDS/UL (ref 149–390)
PMV BLD AUTO: 10.3 FL (ref 8.9–12.7)
POTASSIUM SERPL-SCNC: 3.6 MMOL/L (ref 3.5–5.3)
PROT SERPL-MCNC: 7.6 G/DL (ref 6.4–8.4)
RBC # BLD AUTO: 5.12 MILLION/UL (ref 3.88–5.62)
SODIUM SERPL-SCNC: 138 MMOL/L (ref 135–147)
WBC # BLD AUTO: 6.01 THOUSAND/UL (ref 4.31–10.16)

## 2024-09-27 PROCEDURE — 86140 C-REACTIVE PROTEIN: CPT

## 2024-09-27 PROCEDURE — 36415 COLL VENOUS BLD VENIPUNCTURE: CPT

## 2024-09-27 PROCEDURE — 80053 COMPREHEN METABOLIC PANEL: CPT

## 2024-09-27 PROCEDURE — 85027 COMPLETE CBC AUTOMATED: CPT

## 2024-12-02 ENCOUNTER — TELEPHONE (OUTPATIENT)
Dept: GASTROENTEROLOGY | Facility: MEDICAL CENTER | Age: 34
End: 2024-12-02

## 2024-12-02 NOTE — TELEPHONE ENCOUNTER
Called patient to reschedule procedure as Dr. Perez's schedule has changed and he will now be in the office that day.     Left voicemail for patient    Sent "[employer] message    Sent letter

## 2024-12-02 NOTE — TELEPHONE ENCOUNTER
Scheduled date of colonoscopy (as of today):1/9/25  Physician performing colonoscopy:DR OHM  Location of colonoscopy:[facility]  Bowel prep reviewed with patient:CLENPIQ PT HAS  Instructions reviewed with patient by:ZOË  Clearances: NA

## 2024-12-31 ENCOUNTER — TELEPHONE (OUTPATIENT)
Dept: GASTROENTEROLOGY | Facility: MEDICAL CENTER | Age: 34
End: 2024-12-31

## 2024-12-31 NOTE — TELEPHONE ENCOUNTER
Confirming Upcoming Procedure: Colonoscopy on January 9  Physician performing: Dr. Perez  Location of procedure:  [facility]  Prep: Clenpiq  Diabetic: No

## 2025-01-03 ENCOUNTER — ANESTHESIA (OUTPATIENT)
Dept: ANESTHESIOLOGY | Facility: HOSPITAL | Age: 35
End: 2025-01-03

## 2025-01-03 ENCOUNTER — ANESTHESIA EVENT (OUTPATIENT)
Dept: ANESTHESIOLOGY | Facility: HOSPITAL | Age: 35
End: 2025-01-03

## 2025-01-09 ENCOUNTER — ANESTHESIA EVENT (OUTPATIENT)
Dept: GASTROENTEROLOGY | Facility: MEDICAL CENTER | Age: 35
End: 2025-01-09
Payer: COMMERCIAL

## 2025-01-09 ENCOUNTER — ANESTHESIA (OUTPATIENT)
Dept: GASTROENTEROLOGY | Facility: MEDICAL CENTER | Age: 35
End: 2025-01-09
Payer: COMMERCIAL

## 2025-01-09 ENCOUNTER — HOSPITAL ENCOUNTER (OUTPATIENT)
Dept: GASTROENTEROLOGY | Facility: MEDICAL CENTER | Age: 35
Setting detail: OUTPATIENT SURGERY
End: 2025-01-09
Payer: COMMERCIAL

## 2025-01-09 VITALS
HEART RATE: 74 BPM | BODY MASS INDEX: 25.99 KG/M2 | RESPIRATION RATE: 16 BRPM | TEMPERATURE: 97.4 F | DIASTOLIC BLOOD PRESSURE: 64 MMHG | SYSTOLIC BLOOD PRESSURE: 112 MMHG | WEIGHT: 197 LBS | OXYGEN SATURATION: 100 %

## 2025-01-09 DIAGNOSIS — K51.30 ULCERATIVE RECTOSIGMOIDITIS WITHOUT COMPLICATION (HCC): ICD-10-CM

## 2025-01-09 PROCEDURE — 88305 TISSUE EXAM BY PATHOLOGIST: CPT | Performed by: PATHOLOGY

## 2025-01-09 PROCEDURE — 45380 COLONOSCOPY AND BIOPSY: CPT | Performed by: STUDENT IN AN ORGANIZED HEALTH CARE EDUCATION/TRAINING PROGRAM

## 2025-01-09 RX ORDER — SODIUM CHLORIDE 9 MG/ML
125 INJECTION, SOLUTION INTRAVENOUS CONTINUOUS
Status: DISCONTINUED | OUTPATIENT
Start: 2025-01-09 | End: 2025-01-13 | Stop reason: HOSPADM

## 2025-01-09 RX ORDER — PROPOFOL 10 MG/ML
INJECTION, EMULSION INTRAVENOUS CONTINUOUS PRN
Status: DISCONTINUED | OUTPATIENT
Start: 2025-01-09 | End: 2025-01-09

## 2025-01-09 RX ORDER — SODIUM CHLORIDE 9 MG/ML
125 INJECTION, SOLUTION INTRAVENOUS CONTINUOUS
Status: CANCELLED | OUTPATIENT
Start: 2025-01-09

## 2025-01-09 RX ORDER — PROPOFOL 10 MG/ML
INJECTION, EMULSION INTRAVENOUS AS NEEDED
Status: DISCONTINUED | OUTPATIENT
Start: 2025-01-09 | End: 2025-01-09

## 2025-01-09 RX ADMIN — SODIUM CHLORIDE 125 ML/HR: 0.9 INJECTION, SOLUTION INTRAVENOUS at 10:00

## 2025-01-09 RX ADMIN — PROPOFOL 150 MCG/KG/MIN: 10 INJECTION, EMULSION INTRAVENOUS at 10:31

## 2025-01-09 RX ADMIN — PROPOFOL 40 MG: 10 INJECTION, EMULSION INTRAVENOUS at 10:38

## 2025-01-09 RX ADMIN — PROPOFOL 100 MG: 10 INJECTION, EMULSION INTRAVENOUS at 10:31

## 2025-01-09 RX ADMIN — PROPOFOL 30 MG: 10 INJECTION, EMULSION INTRAVENOUS at 10:36

## 2025-01-09 RX ADMIN — PROPOFOL 30 MG: 10 INJECTION, EMULSION INTRAVENOUS at 10:33

## 2025-01-09 NOTE — ANESTHESIA POSTPROCEDURE EVALUATION
Post-Op Assessment Note    CV Status:  Stable  Pain Score: 0    Pain management: adequate       Mental Status:  Alert and awake   Hydration Status:  Euvolemic   PONV Controlled:  Controlled   Airway Patency:  Patent     Post Op Vitals Reviewed: Yes    No anethesia notable event occurred.    Staff: Anesthesiologist, CRNA           Last Filed PACU Vitals:  Vitals Value Taken Time   Temp     Pulse 77 01/09/25 1049   /63 01/09/25 1049   Resp 16 01/09/25 1049   SpO2 97 % 01/09/25 1049       Modified Kofi:     Vitals Value Taken Time   Activity 2 01/09/25 1049   Respiration 2 01/09/25 1049   Circulation 2 01/09/25 1049   Consciousness 1 01/09/25 1049   Oxygen Saturation 2 01/09/25 1049     Modified Kofi Score: 9

## 2025-01-09 NOTE — ANESTHESIA PREPROCEDURE EVALUATION
Procedure:  COLONOSCOPY    Relevant Problems   HEMATOLOGY   (+) Coagulopathy (HCC)      NEURO/PSYCH   (+) Post-traumatic headache, not intractable        Physical Exam    Airway    Mallampati score: I  TM Distance: >3 FB  Neck ROM: full     Dental       Cardiovascular  Cardiovascular exam normal    Pulmonary  Pulmonary exam normal     Other Findings        Anesthesia Plan  ASA Score- 2     Anesthesia Type- IV sedation with anesthesia with ASA Monitors.         Additional Monitors:     Airway Plan:            Plan Factors-Exercise tolerance (METS): >4 METS.    Chart reviewed.   Existing labs reviewed. Patient summary reviewed.    Patient is not a current smoker.              Induction- intravenous.    Postoperative Plan-         Informed Consent- Anesthetic plan and risks discussed with patient.

## 2025-01-09 NOTE — H&P
History and Physical -  [facility]  Osiel Henderson 34 y.o. male MRN: [id number]          HPI: Osiel Henderson is a 34 y.o. year old male who presents for colonoscopy to follow-up ulcerative proctosigmoiditis. He is on oral mesalamine.      REVIEW OF SYSTEMS: Per the HPI, and otherwise unremarkable.    Historical Information   Past Medical History:   Diagnosis Date    Denial      Past Surgical History:   Procedure Laterality Date    WISDOM TOOTH EXTRACTION       Social History   Social History     Substance and Sexual Activity   Alcohol Use Not Currently     Social History     Substance and Sexual Activity   Drug Use Never     Social History     Tobacco Use   Smoking Status Never   Smokeless Tobacco Never     Family History   Problem Relation Age of Onset    Thyroid disease Mother     Colon polyps Mother     Hypertension Father     Colon cancer Father     Thyroid disease Brother     Colon polyps Maternal Grandmother        Meds/Allergies       Current Outpatient Medications:     mesalamine (LIALDA) 1.2 g EC tablet    mesalamine (ROWASA) 4 g    methylPREDNISolone 4 MG tablet therapy pack    neomycin-polymyxin-hydrocortisone (CORTISPORIN) otic solution    sodium picosulfate, magnesium oxide, citric acid (Clenpiq) oral solution    No Known Allergies    Objective     There were no vitals taken for this visit.      PHYSICAL EXAM    GEN: NAD  CARDIO: RRR  PULM: CTA bilaterally  ABD: soft, non-tender, non-distended  EXT: no lower extremity edema  NEURO: AAOx3      ASSESSMENT/PLAN:  34 y.o. year old male here for colonoscopy; he is stable and optimized for his procedure.

## 2025-02-07 ENCOUNTER — RESULTS FOLLOW-UP (OUTPATIENT)
Dept: GASTROENTEROLOGY | Facility: MEDICAL CENTER | Age: 35
End: 2025-02-07

## 2025-04-23 NOTE — NURSING NOTE
1220 Patient has confirmed chair time for MWF 2nd 1:45pm shift chair starting on 4/25 at 1pm for paperwork.    Please instruct patient to bring insurance cards, medications or list of meds and any other medical information.  Phone: [phone] with questions.    DCP is [facility]  Located at:   [address] [place] [id number]    Patient will go to room 180. Report can be called to [phone].    Transition of Care Plan:    RUR: 24  Prior Level of Functioning: Needs assistance   Disposition: SNF  DEANNE: 4/23/25  If SNF or IPR: Date FOC offered: 4/21/25  Date FOC received: 4/21/25  Accepting facility: [facility]  Date authorization started with reference number: [id number]   Date authorization received and expires: Received 4/23/25  Follow up appointments: N/a  DME needed: N/a  Transportation at discharge: [facility]   /IMM Medicare/ letter given: yes  Is patient a  and connected with [facility]? N/a   If yes, was  transfer form completed and [facility] notified?   Caregiver Contact: Patient notified   Discharge Caregiver contacted prior to discharge? N/a  Care Conference needed? no  Barriers to discharge: none      0960 CM noted discharge order.     DCP is [facility].   CM notified that insurance auth was approved.     Patient is still pending confirmed chair time with Rajwinder Bahena.   All clinicals sent via [employer].     CM will continue to follow.    Wife at bedside

## 2025-05-01 DIAGNOSIS — K51.30 ULCERATIVE RECTOSIGMOIDITIS WITHOUT COMPLICATION (HCC): ICD-10-CM

## 2025-05-01 RX ORDER — MESALAMINE 1.2 G/1
2400 TABLET, DELAYED RELEASE ORAL
Qty: 180 TABLET | Refills: 1 | Status: SHIPPED | OUTPATIENT
Start: 2025-05-01

## 2025-05-01 NOTE — TELEPHONE ENCOUNTER
Pharmacy change    Reason for call:   [x] Refill   [] Prior Auth  [] Other:     Office:   [] PCP/Provider -   [x] Specialty/Provider - gastro/ Venkatesh Perez MD     Medication: mesalamine (LIALDA) 1.2 g EC tablet     Dose/Frequency: 2,400 mg, Oral, Daily with breakfast     Quantity: 180    Pharmacy: [facility]/pharmacy #[id number] [address]      Local Pharmacy   Does the patient have enough for 3 days?   [x] Yes   [] No - Send as HP to POD    Mail Away Pharmacy   Does the patient have enough for 10 days?   [] Yes   [] No - Send as HP to POD